# Patient Record
Sex: MALE | Race: WHITE | NOT HISPANIC OR LATINO | Employment: FULL TIME | URBAN - NONMETROPOLITAN AREA
[De-identification: names, ages, dates, MRNs, and addresses within clinical notes are randomized per-mention and may not be internally consistent; named-entity substitution may affect disease eponyms.]

---

## 2018-12-31 ENCOUNTER — TRANSCRIBE ORDERS (OUTPATIENT)
Dept: ADMINISTRATIVE | Facility: HOSPITAL | Age: 33
End: 2018-12-31

## 2018-12-31 ENCOUNTER — LAB (OUTPATIENT)
Dept: LAB | Facility: HOSPITAL | Age: 33
End: 2018-12-31
Attending: PEDIATRICS

## 2018-12-31 DIAGNOSIS — R50.9 FEVER, UNSPECIFIED FEVER CAUSE: ICD-10-CM

## 2018-12-31 DIAGNOSIS — R50.9 FEVER, UNSPECIFIED FEVER CAUSE: Primary | ICD-10-CM

## 2018-12-31 LAB
FLUAV AG NPH QL: NEGATIVE
FLUBV AG NPH QL IA: NEGATIVE

## 2018-12-31 PROCEDURE — 87804 INFLUENZA ASSAY W/OPTIC: CPT

## 2021-07-28 ENCOUNTER — OFFICE VISIT (OUTPATIENT)
Dept: OTOLARYNGOLOGY | Facility: CLINIC | Age: 36
End: 2021-07-28

## 2021-07-28 VITALS — TEMPERATURE: 97.2 F | DIASTOLIC BLOOD PRESSURE: 92 MMHG | HEART RATE: 96 BPM | SYSTOLIC BLOOD PRESSURE: 152 MMHG

## 2021-07-28 DIAGNOSIS — R22.1 NECK MASS: Primary | ICD-10-CM

## 2021-07-28 PROCEDURE — 99203 OFFICE O/P NEW LOW 30 MIN: CPT | Performed by: OTOLARYNGOLOGY

## 2021-07-28 NOTE — PROGRESS NOTES
PRIMARY CARE PROVIDER: Nahomy Gardner APRN  REFERRING PROVIDER: No ref. provider found    Chief Complaint   Patient presents with   • Cyst     Cyst to neck       Subjective   History of Present Illness:  Edvin Simmons is a  35 y.o. male who presents for consultation regarding a mas of the midline anterior neck.  The lesion has the following characteristics:    Quality: raised nodule  Severity: moderate  Duration: >10 years  Timing: constant  Modifying Factors: no dysphagia or family history of thyroid disorders  Associated Signs & Symptoms: no lymph node swelling.  Has never drained or become infected.    Review of Systems:  Review of Systems   Constitutional: Negative for chills, fatigue, fever and unexpected weight change.   HENT: Negative for facial swelling.    Respiratory: Negative for cough, chest tightness and shortness of breath.    Cardiovascular: Negative for chest pain.   Musculoskeletal: Negative for neck pain.   Skin: Negative for color change.   Neurological: Negative for facial asymmetry.   Hematological: Negative for adenopathy. Does not bruise/bleed easily.       Past History:  Past Medical History:   Diagnosis Date   • Acid reflux      History reviewed. No pertinent surgical history.  History reviewed. No pertinent family history.  Social History     Tobacco Use   • Smoking status: Never Smoker   • Smokeless tobacco: Never Used   Substance Use Topics   • Alcohol use: Not on file   • Drug use: No     Allergies:  Patient has no known allergies.    Current Outpatient Medications:   •  esomeprazole (NexIUM) 40 MG packet, Take 40 mg by mouth., Disp: , Rfl:   •  triamcinolone (KENALOG) 0.5 % cream, Apply  topically 3 (Three) Times a Day., Disp: 15 g, Rfl: 0    Objective     Vital Signs:  Temp:  [97.2 °F (36.2 °C)] 97.2 °F (36.2 °C)  Heart Rate:  [96] 96  BP: (152)/(92) 152/92    Physical Exam:  Physical Exam  Vitals and nursing note reviewed.   Constitutional:       General: He is not in acute  distress.     Appearance: He is well-developed. He is not diaphoretic.   HENT:      Head: Normocephalic and atraumatic.      Right Ear: External ear normal.      Left Ear: External ear normal.      Nose: Nose normal.      Mouth/Throat:      Lips: Pink.      Mouth: Mucous membranes are moist.      Dentition: Normal dentition. No gingival swelling.      Tongue: No lesions. Tongue does not deviate from midline.      Palate: No mass and lesions.      Pharynx: Oropharynx is clear.   Eyes:      General: No scleral icterus.        Right eye: No discharge.         Left eye: No discharge.      Conjunctiva/sclera: Conjunctivae normal.      Pupils: Pupils are equal, round, and reactive to light.   Neck:      Thyroid: No thyromegaly.      Vascular: No JVD.      Trachea: No tracheal deviation.     Pulmonary:      Effort: Pulmonary effort is normal.      Breath sounds: No stridor.   Musculoskeletal:         General: No deformity. Normal range of motion.      Cervical back: Normal range of motion and neck supple.   Lymphadenopathy:      Cervical: No cervical adenopathy.   Skin:     General: Skin is warm and dry.      Coloration: Skin is not pale.      Findings: No erythema or rash.   Neurological:      Mental Status: He is alert and oriented to person, place, and time.      Cranial Nerves: No cranial nerve deficit.      Coordination: Coordination normal.   Psychiatric:         Speech: Speech normal.         Behavior: Behavior normal. Behavior is cooperative.         Thought Content: Thought content normal.         Judgment: Judgment normal.         Operative plan:        Assessment   1. Neck mass        Plan     I have offered excision of the cyst of the neck with  permanent section analysis and likely  linear closure closure in the office under local anesthesia.  This palpates unrelated to the thyroid.  I do not feel that preoperative ultrasound of the thyroid is necessary, as he has normal palpable thyroid, and this does not  elevate with swallowing.    Discussion of skin lesion. Discussed risks, benefits, alternatives, and possible complications of excision of the skin lesion with reconstruction utilizing local tissue rearrangement, full-thickness skin grafting, or local interpolated flaps. Risks include, but are not limited too: bleeding, infection, hematoma, recurrence, need for additional procedures, flap failure, cosmetic deformity. Patient understands risks and would like to proceed with surgery.     My findings and recommendations were discussed and questions were answered.     Shon Cox MD  07/28/21  10:53 CDT

## 2021-07-28 NOTE — PATIENT INSTRUCTIONS
CONTACT INFORMATION:  The main office phone number is [phone]. For emergencies after hours and on weekends, this number will convert over to our answering service and the on call provider will answer. Please try to keep non emergent phone calls/ questions to office hours 9am-5pm Monday through Friday.     angelMD  As an alternative, you can sign up and use the Epic MyChart system for more direct and quicker access for non emergent questions/ problems.  [facility] angelMD allows you to send messages to your doctor, view your test results, renew your prescriptions, schedule appointments, and more. To sign up, go to [employer] and click on the Sign Up Now link in the New User? box. Enter your angelMD Activation Code exactly as it appears below along with the last four digits of your Social Security Number and your Date of Birth () to complete the sign-up process. If you do not sign up before the expiration date, you must request a new code.    angelMD Activation Code: [email or web address]  Expires: 2021 10:52 AM    If you have questions, you can email [email or web address] or call [phone] to talk to our angelMD staff. Remember, angelMD is NOT to be used for urgent needs. For medical emergencies, dial 911.

## 2021-09-08 ENCOUNTER — TRANSCRIBE ORDERS (OUTPATIENT)
Dept: LAB | Facility: HOSPITAL | Age: 36
End: 2021-09-08

## 2021-09-08 DIAGNOSIS — Z01.818 PREOP TESTING: Primary | ICD-10-CM

## 2021-09-11 ENCOUNTER — LAB (OUTPATIENT)
Dept: LAB | Facility: HOSPITAL | Age: 36
End: 2021-09-11

## 2021-09-11 PROCEDURE — U0004 COV-19 TEST NON-CDC HGH THRU: HCPCS | Performed by: OTOLARYNGOLOGY

## 2021-09-11 PROCEDURE — C9803 HOPD COVID-19 SPEC COLLECT: HCPCS | Performed by: OTOLARYNGOLOGY

## 2021-09-12 LAB — SARS-COV-2 ORF1AB RESP QL NAA+PROBE: NOT DETECTED

## 2021-09-15 ENCOUNTER — PROCEDURE VISIT (OUTPATIENT)
Dept: OTOLARYNGOLOGY | Facility: CLINIC | Age: 36
End: 2021-09-15

## 2021-09-15 VITALS — SYSTOLIC BLOOD PRESSURE: 140 MMHG | TEMPERATURE: 97.2 F | HEART RATE: 102 BPM | DIASTOLIC BLOOD PRESSURE: 87 MMHG

## 2021-09-15 DIAGNOSIS — R22.1 NECK MASS: Primary | ICD-10-CM

## 2021-09-15 PROCEDURE — 13131 CMPLX RPR F/C/C/M/N/AX/G/H/F: CPT | Performed by: OTOLARYNGOLOGY

## 2021-09-15 PROCEDURE — 11422 EXC H-F-NK-SP B9+MARG 1.1-2: CPT | Performed by: OTOLARYNGOLOGY

## 2021-09-15 PROCEDURE — 88304 TISSUE EXAM BY PATHOLOGIST: CPT | Performed by: OTOLARYNGOLOGY

## 2021-09-15 NOTE — PROGRESS NOTES
PATIENT NAME:  Edvin Simmons     DATE:  09/15/21    PREOPERATIVE DIAGNOSIS:  Cyst of neck    POSTOPERATIVE DIAGNOSIS:  Cyst of neck    PROCEDURE:  Excision of cyst of midline neck, 1.5 cm, subcutaneous    SURGEON:  Shon Cox MD, FACS    FACILITY: [facility] Office Procedure Room    ANESTHESIA:  Local with 2 cc 1% lidocaine and 1:100,000 epinephrine    DICTATED BY:  Shon Cox MD, FACS    IVF: None    IMPLANTS: None    DRAINS: None    SPECIMENS: Cyst of neck    EBL: Scant    COMPLICATIONS: None    INDICATIONS FOR SURGERY: Mr. Simmons presented with an enlarging cyst of his neck.  He opted for surgical excision.    OPERATIVE FINDINGS: The cyst measured 1.5 cm and is consistent with an epidermal inclusion cyst.    OPERATIVE DETAILS:       After patient verification consent material was reviewed, the patient was taken to the procedure room and laid supine on the procedure table.  The skin at the area was cleansed with alcohol, the area marked, the patient was then handed a mirror where we reviewed the intended excision, and verified the consent.  This served as the formal timeout procedure.  The skin was  infiltrated with 1% lidocaine with 1-100,000 epinephrine.    After approximately 15 minutes, the skin was tested for anesthesia and deemed appropriate.  The skin was cleansed with Hibiclense and the patient was draped with sterile towels.    A fusiform incision was created using a 15 blade and the underlying cyst was identified and removed using curved iris scissors.  The skin was then closed using a deep 4-0 undyed Vicryl suture followed by running, locking 5-0 fast-absorbing gut.  Antibiotic ointment was placed to the incision.  The patient tolerated the procedure without any complication.      DISPOSITION:  The procedures were completed without complication and tolerated well.  The patient was released in the company of himself to return home in satisfactory condition.  A follow-up  appointment has been scheduled, routine post-op medications prescribed (if required), and post-op instructions were given to the responsible party.           Shon Cox MD, FACS  Board Certified Facial Plastic and Reconstructive Surgery  Board Certified Otolaryngology -- Head and Neck Surgery    Electronically signed by Shon Cox MD, 09/15/21, 4:56 PM CDT.

## 2021-09-15 NOTE — PATIENT INSTRUCTIONS
CONTACT INFORMATION:  The main office phone number is [phone]. For emergencies after hours and on weekends, this number will convert over to our answering service and the on call provider will answer. Please try to keep non emergent phone calls/ questions to office hours 9am-5pm Monday through Friday.     Always Prepped  As an alternative, you can sign up and use the Epic MyChart system for more direct and quicker access for non emergent questions/ problems.  [facility] Always Prepped allows you to send messages to your doctor, view your test results, renew your prescriptions, schedule appointments, and more. To sign up, go to [employer] and click on the Sign Up Now link in the New User? box. Enter your Always Prepped Activation Code exactly as it appears below along with the last four digits of your Social Security Number and your Date of Birth () to complete the sign-up process. If you do not sign up before the expiration date, you must request a new code.    Always Prepped Activation Code: [email or web address]  Expires: 10/11/2021  9:00 AM    If you have questions, you can email [email or web address] or call [phone] to talk to our Always Prepped staff. Remember, Always Prepped is NOT to be used for urgent needs. For medical emergencies, dial 911.

## 2021-09-23 ENCOUNTER — TELEPHONE (OUTPATIENT)
Dept: OTOLARYNGOLOGY | Facility: CLINIC | Age: 36
End: 2021-09-23

## 2021-09-23 LAB
CYTO UR: NORMAL
LAB AP CASE REPORT: NORMAL
LAB AP CLINICAL INFORMATION: NORMAL
PATH REPORT.FINAL DX SPEC: NORMAL
PATH REPORT.GROSS SPEC: NORMAL

## 2021-09-23 NOTE — TELEPHONE ENCOUNTER
"Patient has been contacted with result of his biopsy. All is cleared \"cyst\" patient will follow up as need it.  "

## 2023-10-18 ENCOUNTER — OFFICE VISIT (OUTPATIENT)
Age: 38
End: 2023-10-18
Payer: COMMERCIAL

## 2023-10-18 VITALS
HEART RATE: 84 BPM | RESPIRATION RATE: 20 BRPM | DIASTOLIC BLOOD PRESSURE: 76 MMHG | WEIGHT: 203 LBS | TEMPERATURE: 98 F | BODY MASS INDEX: 31.86 KG/M2 | SYSTOLIC BLOOD PRESSURE: 148 MMHG | HEIGHT: 67 IN

## 2023-10-18 DIAGNOSIS — Z98.890 H/O REMOVAL OF NECK CYST: Primary | ICD-10-CM

## 2023-10-18 DIAGNOSIS — R22.1 NECK MASS: ICD-10-CM

## 2023-10-18 NOTE — PROGRESS NOTES
PRIMARY CARE PROVIDER: Provider, No Known  REFERRING PROVIDER: No ref. provider found    Chief Complaint   Patient presents with    Follow-up     Cyst of neck       Subjective   History of Present Illness:  Edvin Simmons is a  37 y.o. male who presents with complaints of a submental fullness.  He reports that 3 weeks ago, he developed a large swelling in the midline under his jaw.  This occurred all of a sudden and was above his Josef's apple.  Approximately 2 weeks later, he developed a sinus infection with sinus congestion.  He started on an antibiotic, which has helped to decrease the size of the submental fullness.  He has had a history of a cyst that I removed from his lower midline neck (9/15/21), much inferior to his present complaint.  He denies any dysphagia.  When I asked if the fullness elevated when he swallowed, he said that he did not.        Review of Systems:  Review of Systems   Constitutional:  Negative for chills, fatigue, fever and unexpected weight change.   HENT:  Negative for dental problem and facial swelling.    Respiratory:  Negative for cough, chest tightness and shortness of breath.    Cardiovascular:  Negative for chest pain.   Musculoskeletal:  Negative for neck pain.   Skin:  Negative for color change.   Neurological:  Negative for facial asymmetry.   Hematological:  Negative for adenopathy. Does not bruise/bleed easily.       Past History:  Past Medical History:   Diagnosis Date    Acid reflux     Gout      History reviewed. No pertinent surgical history.  History reviewed. No pertinent family history.  Social History     Tobacco Use    Smoking status: Never    Smokeless tobacco: Never   Vaping Use    Vaping Use: Never used   Substance Use Topics    Alcohol use: Never    Drug use: No     Allergies:  Patient has no known allergies.    Current Outpatient Medications:     allopurinol (ZYLOPRIM) 100 MG tablet, Take 1 tablet by mouth Daily., Disp: , Rfl:     esomeprazole (NexIUM) 40 MG  packet, Take 40 mg by mouth., Disp: , Rfl:       Objective     Vital Signs:  Temp:  [98 °F (36.7 °C)] 98 °F (36.7 °C)  Heart Rate:  [84] 84  Resp:  [20] 20  BP: (148)/(76) 148/76    Physical Exam:  Physical Exam  Vitals and nursing note reviewed.   Constitutional:       General: He is not in acute distress.     Appearance: He is well-developed. He is not diaphoretic.   HENT:      Head: Normocephalic and atraumatic.        Right Ear: External ear normal.      Left Ear: External ear normal.      Nose: Nose normal.      Mouth/Throat:      Lips: Pink.      Mouth: Mucous membranes are moist. No injury.      Dentition: Normal dentition.      Tongue: No lesions.      Palate: No mass.      Pharynx: Oropharynx is clear.      Tonsils: 2+ on the right. 2+ on the left.   Eyes:      General: No scleral icterus.        Right eye: No discharge.         Left eye: No discharge.      Conjunctiva/sclera: Conjunctivae normal.      Pupils: Pupils are equal, round, and reactive to light.   Neck:      Thyroid: No thyromegaly.      Vascular: No JVD.      Trachea: No tracheal deviation.   Pulmonary:      Effort: Pulmonary effort is normal.      Breath sounds: No stridor.   Musculoskeletal:         General: No deformity. Normal range of motion.      Cervical back: Normal range of motion and neck supple.   Lymphadenopathy:      Cervical: No cervical adenopathy.   Skin:     General: Skin is warm and dry.      Coloration: Skin is not pale.      Findings: No erythema or rash.   Neurological:      Mental Status: He is alert and oriented to person, place, and time.      Cranial Nerves: No cranial nerve deficit.      Coordination: Coordination normal.   Psychiatric:         Speech: Speech normal.         Behavior: Behavior normal. Behavior is cooperative.         Thought Content: Thought content normal.         Judgment: Judgment normal.         Prior Cyst:      Results Review:         Assessment   Assessment:  1. H/O removal of neck cyst    2.  Neck mass        Plan   Plan:  The fullness in question is somewhat suspicious for a thyroglossal duct cyst, that recently became inflamed and swollen.  According to Mr. Simmons, this did not elevate when he swallowed, somewhat less consistent with a thyroglossal duct cyst.  I would like to proceed with a contrasted CT scan of the neck to assess.  Based on the location, I do not think this is within the thyroid gland itself.  Once we have that CT scan, we will further evaluate for possible treatment options.      Patient Instructions        CONTACT INFORMATION:  The main office phone number is [phone]. For emergencies after hours and on weekends, this number will convert over to our answering service and the on call provider will answer. Please try to keep non emergent phone calls/ questions to office hours 9am-5pm Monday through Friday.     Quippi  As an alternative, you can sign up and use the Epic MyChart system for more direct and quicker access for non emergent questions/ problems.  [employer] allows you to send messages to your doctor, view your test results, renew your prescriptions, schedule appointments, and more. To sign up, go to "[employer].    If you have questions, you can email [email or web address] or call [phone] to talk to our Quippi staff. Remember, Quippi is NOT to be used for urgent needs. For medical emergencies, dial 911.          My findings and recommendations were discussed and questions were answered.     Shon Cox MD  10/18/23  12:55 CDT

## 2023-10-18 NOTE — PATIENT INSTRUCTIONS
CONTACT INFORMATION:  The main office phone number is [phone]. For emergencies after hours and on weekends, this number will convert over to our answering service and the on call provider will answer. Please try to keep non emergent phone calls/ questions to office hours 9am-5pm Monday through Friday.     Ghost  As an alternative, you can sign up and use the Epic MyChart system for more direct and quicker access for non emergent questions/ problems.  [facility] Ghost allows you to send messages to your doctor, view your test results, renew your prescriptions, schedule appointments, and more. To sign up, go to [email or web address].    If you have questions, you can email [email or web address] or call [phone] to talk to our Ghost staff. Remember, Ghost is NOT to be used for urgent needs. For medical emergencies, dial 911.

## 2023-10-19 DIAGNOSIS — R22.1 NECK MASS: Primary | ICD-10-CM

## 2023-11-03 ENCOUNTER — HOSPITAL ENCOUNTER (OUTPATIENT)
Dept: CT IMAGING | Facility: HOSPITAL | Age: 38
Discharge: HOME | End: 2023-11-03
Admitting: OTOLARYNGOLOGY
Payer: COMMERCIAL

## 2023-11-03 DIAGNOSIS — R22.1 NECK MASS: ICD-10-CM

## 2023-11-03 PROCEDURE — 25510000001 IOPAMIDOL 61 % SOLUTION: Performed by: OTOLARYNGOLOGY

## 2023-11-03 PROCEDURE — 70491 CT SOFT TISSUE NECK W/DYE: CPT

## 2023-11-03 RX ADMIN — IOPAMIDOL 100 ML: 612 INJECTION, SOLUTION INTRAVENOUS at 09:28

## 2023-11-06 DIAGNOSIS — E04.1 THYROID NODULE: Primary | ICD-10-CM

## 2023-11-07 ENCOUNTER — TRANSCRIBE ORDERS (OUTPATIENT)
Dept: ADMINISTRATIVE | Facility: HOSPITAL | Age: 38
End: 2023-11-07
Payer: COMMERCIAL

## 2023-11-07 DIAGNOSIS — H47.11 PAPILLEDEMA ASSOCIATED WITH INCREASED INTRACRANIAL PRESSURE: Primary | ICD-10-CM

## 2023-12-06 ENCOUNTER — OFFICE VISIT (OUTPATIENT)
Age: 38
End: 2023-12-06
Payer: COMMERCIAL

## 2023-12-06 VITALS — TEMPERATURE: 97.8 F | HEART RATE: 80 BPM | DIASTOLIC BLOOD PRESSURE: 86 MMHG | SYSTOLIC BLOOD PRESSURE: 146 MMHG

## 2023-12-06 DIAGNOSIS — R22.1 NECK MASS: ICD-10-CM

## 2023-12-06 DIAGNOSIS — E04.1 THYROID NODULE: Primary | ICD-10-CM

## 2023-12-06 RX ORDER — ACETAZOLAMIDE 500 MG/1
CAPSULE, EXTENDED RELEASE ORAL
COMMUNITY
Start: 2023-12-06

## 2023-12-06 NOTE — PROGRESS NOTES
PRIMARY CARE PROVIDER: Bogdan Martinez MD  REFERRING PROVIDER: No ref. provider found    Chief Complaint   Patient presents with    Post-op Follow-up     Follow up with US        Subjective   History of Present Illness:  Edvin Simmons is a  38 y.o. male who returns to the office for imaging review.  On 9/15/21, I removed a superficial epidermal inclusion cyst of the left neck.  When seen on 10/18/23, he complained of a 3 week history of a large swelling neck, under his jaw, above his Josef's apple.  Approximately 2 weeks later, he developed a sinus infection with sinus congestion.  He was started on an antibiotic, which has helped to decrease the size of the submental fullness.  Today, he is doing well without complaint.    No family history of thyroid issues.      Review of Systems:  Review of Systems   Constitutional:  Negative for chills, fatigue, fever and unexpected weight change.   HENT:  Negative for dental problem and facial swelling.    Respiratory:  Negative for cough, chest tightness and shortness of breath.    Cardiovascular:  Negative for chest pain.   Musculoskeletal:  Negative for neck pain.   Skin:  Negative for color change.   Neurological:  Negative for facial asymmetry.   Hematological:  Negative for adenopathy. Does not bruise/bleed easily.       Past History:  Past Medical History:   Diagnosis Date    Acid reflux     Gout      History reviewed. No pertinent surgical history.  History reviewed. No pertinent family history.  Social History     Tobacco Use    Smoking status: Never    Smokeless tobacco: Never   Vaping Use    Vaping Use: Never used   Substance Use Topics    Alcohol use: Never    Drug use: No     Allergies:  Patient has no known allergies.    Current Outpatient Medications:     acetaZOLAMIDE (DIAMOX) 500 MG capsule, , Disp: , Rfl:     allopurinol (ZYLOPRIM) 100 MG tablet, Take 1 tablet by mouth Daily., Disp: , Rfl:     esomeprazole (NexIUM) 40 MG packet, Take 40 mg by mouth.,  Disp: , Rfl:       Objective     Vital Signs:  Temp:  [97.8 °F (36.6 °C)] 97.8 °F (36.6 °C)  Heart Rate:  [80] 80  BP: (146)/(86) 146/86    Physical Exam:  Physical Exam  Vitals and nursing note reviewed.   Constitutional:       General: He is not in acute distress.     Appearance: He is well-developed. He is not diaphoretic.   HENT:      Head: Normocephalic and atraumatic.        Right Ear: External ear normal.      Left Ear: External ear normal.      Nose: Nose normal.      Mouth/Throat:      Lips: Pink.      Mouth: Mucous membranes are moist. No injury.      Dentition: Normal dentition.      Tongue: No lesions.      Palate: No mass.      Pharynx: Oropharynx is clear.      Tonsils: 2+ on the right. 2+ on the left.   Eyes:      General: No scleral icterus.        Right eye: No discharge.         Left eye: No discharge.      Conjunctiva/sclera: Conjunctivae normal.      Pupils: Pupils are equal, round, and reactive to light.   Neck:      Thyroid: Thyroid mass (Right lobe) and thyromegaly present.      Vascular: No JVD.      Trachea: No tracheal deviation.   Pulmonary:      Effort: Pulmonary effort is normal.      Breath sounds: No stridor.   Musculoskeletal:         General: No deformity. Normal range of motion.      Cervical back: Normal range of motion and neck supple.   Lymphadenopathy:      Cervical: No cervical adenopathy.   Skin:     General: Skin is warm and dry.      Coloration: Skin is not pale.      Findings: No erythema or rash.   Neurological:      Mental Status: He is alert and oriented to person, place, and time.      Cranial Nerves: No cranial nerve deficit.      Coordination: Coordination normal.   Psychiatric:         Speech: Speech normal.         Behavior: Behavior normal. Behavior is cooperative.         Thought Content: Thought content normal.         Judgment: Judgment normal.         Prior Cyst:      Results Review:         CT scan from October 19, 2023 was reviewed.  There is a soft tissue  fullness in the midline at the level of the thyroid cartilage.  There is also a abnormal appearing thyroid tissue on the right.            US from today:                Assessment   Assessment:  1. Thyroid nodule    2. Neck mass          Plan   Plan:    He has a right thyroid mass this is concerning.  I would also like to assess to see if the midline mass is c/w thyroid tissue.  We will set him up for fine-needle aspiration of both.  We will obtain a TSH baseline.  I discussed the possibility of thyroid lobectomy versus thyroidectomy and possible Sistrunk (especially if the FNA is concerning for cancer).  We discussed the function of the thyroid gland.    My findings and recommendations were discussed and questions were answered.     Shon Cox MD  12/06/23  14:04 CST

## 2023-12-18 ENCOUNTER — HOSPITAL ENCOUNTER (OUTPATIENT)
Dept: ULTRASOUND IMAGING | Facility: HOSPITAL | Age: 38
Discharge: HOME | End: 2023-12-18
Payer: COMMERCIAL

## 2023-12-18 DIAGNOSIS — E04.1 THYROID NODULE: ICD-10-CM

## 2023-12-18 DIAGNOSIS — R22.1 NECK MASS: ICD-10-CM

## 2023-12-18 PROCEDURE — 88112 CYTOPATH CELL ENHANCE TECH: CPT | Performed by: NURSE PRACTITIONER

## 2023-12-18 PROCEDURE — 88172 CYTP DX EVAL FNA 1ST EA SITE: CPT | Performed by: NURSE PRACTITIONER

## 2023-12-18 PROCEDURE — 76942 ECHO GUIDE FOR BIOPSY: CPT

## 2023-12-19 LAB
BEAKER LAB AP INTRAOPERATIVE CONSULTATION: NORMAL
CYTO UR: NORMAL
LAB AP CASE REPORT: NORMAL
LAB AP CLINICAL INFORMATION: NORMAL
Lab: NORMAL
PATH REPORT.FINAL DX SPEC: NORMAL
PATH REPORT.GROSS SPEC: NORMAL

## 2023-12-22 ENCOUNTER — LAB (OUTPATIENT)
Dept: LAB | Facility: HOSPITAL | Age: 38
End: 2023-12-22
Payer: COMMERCIAL

## 2023-12-22 DIAGNOSIS — E04.1 THYROID NODULE: ICD-10-CM

## 2023-12-22 LAB — TSH SERPL DL<=0.05 MIU/L-ACNC: 1.65 UIU/ML (ref 0.27–4.2)

## 2023-12-22 PROCEDURE — 84443 ASSAY THYROID STIM HORMONE: CPT

## 2023-12-22 PROCEDURE — 36415 COLL VENOUS BLD VENIPUNCTURE: CPT

## 2023-12-24 ENCOUNTER — TELEPHONE (OUTPATIENT)
Age: 38
End: 2023-12-24
Payer: COMMERCIAL

## 2023-12-24 DIAGNOSIS — Q39.9: ICD-10-CM

## 2023-12-24 DIAGNOSIS — R91.1 LUNG NODULE SEEN ON IMAGING STUDY: Primary | ICD-10-CM

## 2023-12-24 NOTE — TELEPHONE ENCOUNTER
I called and spoke to the patient about the addendum to his CT scan of the neck.  The reading radiologist feels that this is the esophagus, but can potentially confirm this with a contrast esophagram.  He also noted some mediastinal lymphadenopathy and a right upper lobe nodule.  A CT scan of the chest was recommended.  I discussed that we will go ahead and order these, and reach out once we have the results.      Electronically signed by Shon Cox MD, 12/24/23, 9:54 AM CST.

## 2023-12-29 ENCOUNTER — TELEPHONE (OUTPATIENT)
Dept: INTERVENTIONAL RADIOLOGY/VASCULAR | Facility: HOSPITAL | Age: 38
End: 2023-12-29
Payer: COMMERCIAL

## 2024-01-25 ENCOUNTER — TELEPHONE (OUTPATIENT)
Dept: NEUROLOGY | Facility: CLINIC | Age: 39
End: 2024-01-25
Payer: COMMERCIAL

## 2024-01-26 ENCOUNTER — OFFICE VISIT (OUTPATIENT)
Dept: NEUROLOGY | Facility: CLINIC | Age: 39
End: 2024-01-26
Payer: COMMERCIAL

## 2024-01-26 VITALS
OXYGEN SATURATION: 97 % | WEIGHT: 190 LBS | SYSTOLIC BLOOD PRESSURE: 120 MMHG | HEART RATE: 78 BPM | HEIGHT: 67 IN | DIASTOLIC BLOOD PRESSURE: 80 MMHG | BODY MASS INDEX: 29.82 KG/M2

## 2024-01-26 DIAGNOSIS — H47.10 PAPILLEDEMA: Primary | ICD-10-CM

## 2024-01-26 RX ORDER — LOSARTAN POTASSIUM 25 MG/1
25 TABLET ORAL DAILY
COMMUNITY
Start: 2023-12-15

## 2024-01-26 NOTE — PROGRESS NOTES
Neurology Consult Note    [facility]  [address], [address]  [place] [address]  Phone: [phone]  Fax: [phone]    Referring Provider:   Bogdan Martinez MD  Primary Care Provider:  Bogdan Martinez MD    Reason for Consult:  Benign intracranial hypertension  Subjective        Edvin Simmons presents to [facility]    History of Present Illness  38-year-old male referred to me by Dr. Bogdan Martinez for evaluation of benign intracranial hypertension.  Patient was seen by Dr. Felder optometry at [facility] on 11/7/2023.  Patient had presented for an emergency visit due to blurred peripheral vision OU, headaches with visual disturbances.  Patient denied photophobia or other symptoms.  Review of the assessment of his optic nerve revealed 3+ papilledema bilaterally with hemorrhage on the superior ream of ONH of left optic nerve.  Patient was referred for MRI of his head and orbits with and without contrast.  Additional review of the record shows if MRI was clean, Dr. Felder had planned on starting Diamox.    Patient saw his PCP on 11/10/2023.  Patient was seen for 6-month follow-up.  He went to make sure his PCP was aware of recent diagnosis of cerebral pseudotumor.  Patient reported he saw Dr. Felder due to loss of peripheral vision.  Reportedly symptoms had resolved.    Today patient presents with his wife.  Edvin reports to me that he was at work when he developed sudden blurred vision from the lateral bilateral vision fields.  Reports this lasted approximately 2 to 3 hours.  He does endorse his vision was clear looking straight.  This did not improve if he closed 1 eye or not.  He denies any headaches.  He does not remember he had a headache during this event.    He has been started on Diamox 500 mg daily.  This was done prior to a lumbar puncture.  He also reports to me he is being treated at [facility] for thyroid cancer.   "He is having a possible thyroidectomy on February 29 of this year.  There is plan for radiation afterwards.    He denies any family history of neurological conditions.  He is employed as a  at [employer] in [facility] [employer].  He denies any head injuries or neck injuries.    Again he denies any headaches.  No fevers.  No chills.  No reported eye injuries.    He does report to me he has seen optometry again since being treated.  By his report the papilledema has improved.  He has had no further episodes of blurred vision or vision losses.  There is no problem list on file for this patient.       Past Medical History:   Diagnosis Date    Acid reflux     Gout         Social History     Socioeconomic History    Marital status:    Tobacco Use    Smoking status: Never    Smokeless tobacco: Never   Vaping Use    Vaping Use: Never used   Substance and Sexual Activity    Alcohol use: Never    Drug use: No    Sexual activity: Defer        No Known Allergies       Current Outpatient Medications:     acetaZOLAMIDE (DIAMOX) 500 MG capsule, , Disp: , Rfl:     allopurinol (ZYLOPRIM) 100 MG tablet, Take 1 tablet by mouth Daily., Disp: , Rfl:     esomeprazole (NexIUM) 40 MG packet, Take 40 mg by mouth., Disp: , Rfl:     losartan (COZAAR) 25 MG tablet, Take 1 tablet by mouth Daily., Disp: , Rfl:        Objective   Vital Signs:   /80 (BP Location: Left arm, Patient Position: Sitting, Cuff Size: Adult)   Pulse 78   Ht 170.2 cm (67.01\")   Wt 86.2 kg (190 lb)   SpO2 97%   BMI 29.75 kg/m²       Physical Exam  Vitals and nursing note reviewed.   Constitutional:       General: He is not in acute distress.     Appearance: He is not ill-appearing.   HENT:      Head: Normocephalic.   Eyes:      General: Lids are normal.         Right eye: No discharge.         Left eye: No discharge.      Extraocular Movements: Extraocular movements intact.      Conjunctiva/sclera:      Right eye: No hemorrhage.     " Left eye: No hemorrhage.     Pupils: Pupils are equal, round, and reactive to light.      Funduscopic exam:     Right eye: No hemorrhage. Red reflex present.         Left eye: No hemorrhage. Red reflex present.     Comments: Unable to identify optic disc   Pulmonary:      Effort: Pulmonary effort is normal. No respiratory distress.   Skin:     General: Skin is warm and dry.      Capillary Refill: Capillary refill takes less than 2 seconds.   Neurological:      Mental Status: He is alert.      Motor: Motor strength is normal.     Deep Tendon Reflexes: Reflexes are normal and symmetric.   Psychiatric:         Speech: Speech normal.        Neurological Exam  Mental Status  Alert. Oriented to person, place, time and situation. Speech is normal. Language is fluent with no aphasia.    Cranial Nerves  CN II: Vision test: Unable to identify optic disc. Visual fields full to confrontation.  CN III, IV, VI: Extraocular movements intact bilaterally. Normal lids and orbits bilaterally. Pupils equal round and reactive to light bilaterally.  CN V: Facial sensation is normal.  CN VII: Full and symmetric facial movement.  CN IX, X: Palate elevates symmetrically. Normal gag reflex.  CN XI: Shoulder shrug strength is normal.  CN XII: Tongue midline without atrophy or fasciculations.    Motor  Normal muscle bulk throughout. No fasciculations present. Normal muscle tone. No abnormal involuntary movements. Strength is 5/5 throughout all four extremities.    Sensory  Light touch is normal in upper and lower extremities.     Reflexes  Deep tendon reflexes are 2+ and symmetric in all four extremities.    Gait  Casual gait is normal including stance, stride, and arm swing.      Result Review :   The following data was reviewed by: JENNIFER Gallegos on 01/26/2024:         PROGRESS NOTES - SCAN - PROG NOTE_[facility]_11/07/23 (11/13/2023)       PROGRESS NOTES - SCAN - PROGRESS NOTE_[facility]_11-10-23 (11/10/2023)      PROGRESS NOTES - SCAN - PROGRESS NOTE_[facility]_11-10-23 (11/10/2023)                  Impression:  Edvin Simmons is a 38 y.o. male who presents for evaluation of intracranial hypertension.  Today patient reports improvement in his symptoms.  He has been on Diamox for approximately a month.  He has not had a lumbar puncture.  It is reassuring that he has had no further blurred vision.  I would recommend continuing with lumbar puncture to assess opening pressure as well as CSF studies to ensure no other etiologies.  Cannot diagnose idiopathic cranial hypertension until we perform lumbar puncture to assess opening pressure.  There is a chance his pressure could be normal since he has been on Diamox.    Diagnoses and all orders for this visit:    1. Papilledema (Primary)  -     IR Lumbar Puncture Diagnosis; Future  -     Notify Provider if Patient Taking Blood Thinning Agents; Standing  -     Check & Record Opening & Closing Pressures (LP); Standing  -     Protime-INR; Standing  -     Glucose, CSF - Cerebrospinal Fluid, Lumbar Puncture; Standing  -     Protein, CSF - Cerebrospinal Fluid, Lumbar Puncture; Standing  -     Culture, CSF - Cerebrospinal Fluid, Lumbar Puncture; Standing  -     Cell Count With Differential, CSF Use CSF Tube: 1; Standing  -     Cell Count With Differential, CSF Use CSF Tube: 4; Standing  -     Obtain Informed Consent; Standing  -     Ambulatory Referral to Ophthalmology        Plan:  Continue Diamox per other providers  Lumbar puncture with CSF studies  Referral with ophthalmology for formal evaluation  Obtain records from [facility] of last eye exam  Obtain records from [facility] radiology of MRIs performed  Follow-up with PCP as scheduled  Follow-up with me in 3 months or sooner if need be    The patient and I have discussed the plan of care and he is in full agreement at this time.     Follow Up   Return in about 3 months (around 4/26/2024) for  Papilledema.            Melina Katz, APRN  01/26/24  09:50 CST

## 2024-01-29 ENCOUNTER — TELEPHONE (OUTPATIENT)
Age: 39
End: 2024-01-29
Payer: COMMERCIAL

## 2024-01-29 ENCOUNTER — PATIENT ROUNDING (BHMG ONLY) (OUTPATIENT)
Dept: NEUROLOGY | Facility: CLINIC | Age: 39
End: 2024-01-29
Payer: COMMERCIAL

## 2024-01-29 NOTE — TELEPHONE ENCOUNTER
I was following up on his CT scan of the chest.  It looks like he is establish care with Raul Richards MD at [facility].  There is no answer, but wanted to assure that he had continued continuity of care.      Electronically signed by Shon Cox MD, 01/29/24, 11:31 AM CST.

## 2024-01-29 NOTE — TELEPHONE ENCOUNTER
He called back, after he realized he had missed call.  We confirmed that he had follow-up established at [facility].  They are planning his surgery in February.  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  He is scheduled for a CT scan of his chest, and they will also perform what sounds like laryngoscopy to assess the vocal fold mobility prior to surgery.  He may have a drain after surgery, and wonders if that can be removed here in [place].  I be happy to remove the drain, if that is okay with Dr. Raul Richards.      Electronically signed by Shon Cox MD, 01/29/24, 11:41 AM CST.

## 2024-01-29 NOTE — PROGRESS NOTES
January 29, 2024    Hello, may I speak with Edvin Simmosn?    My name is Dom      I am  with [facility] [place] [place] [facility]  [address]  [address]  [place] [place] [address] [phone].    Before we get started may I verify your date of birth? 1985    I am calling to officially welcome you to our practice and ask about your recent visit. Is this a good time to talk? yes    Tell me about your visit with us. What things went well?  Pt was pleased with [facility].       We're always looking for ways to make our patients' experiences even better. Do you have recommendations on ways we may improve?  no    Overall were you satisfied with your first visit to our practice? yes       I appreciate you taking the time to speak with me today. Is there anything else I can do for you? no      Thank you, and have a great day.

## 2024-02-06 ENCOUNTER — TELEPHONE (OUTPATIENT)
Age: 39
End: 2024-02-06
Payer: COMMERCIAL

## 2024-02-06 NOTE — TELEPHONE ENCOUNTER
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  I offered my personal cell phone to the call center and they informed me they would provide it to his treatment team.  I have not heard from them.    Electronically signed by Shon Cox MD, 02/06/24, 7:06 AM CST.

## 2024-02-16 ENCOUNTER — HOSPITAL ENCOUNTER (OUTPATIENT)
Dept: GENERAL RADIOLOGY | Facility: HOSPITAL | Age: 39
Discharge: HOME | End: 2024-02-16
Payer: COMMERCIAL

## 2024-02-16 VITALS
TEMPERATURE: 97.6 F | BODY MASS INDEX: 30.45 KG/M2 | DIASTOLIC BLOOD PRESSURE: 79 MMHG | HEIGHT: 67 IN | WEIGHT: 194 LBS | RESPIRATION RATE: 16 BRPM | OXYGEN SATURATION: 96 % | HEART RATE: 67 BPM | SYSTOLIC BLOOD PRESSURE: 113 MMHG

## 2024-02-16 DIAGNOSIS — H47.10 PAPILLEDEMA: ICD-10-CM

## 2024-02-16 LAB
APPEARANCE CSF: CLEAR
APPEARANCE CSF: CLEAR
APTT PPP: 29.6 SECONDS (ref 24.5–36)
COLOR CSF: COLORLESS
COLOR CSF: COLORLESS
COLOR SPUN CSF: COLORLESS
COLOR SPUN CSF: COLORLESS
GLUCOSE CSF-MCNC: 63 MG/DL (ref 40–70)
INR PPP: 1.05 (ref 0.91–1.09)
METHOD: ABNORMAL
METHOD: NORMAL
NUC CELL # CSF MANUAL: 0 /MM3 (ref 0–8)
NUC CELL # CSF MANUAL: 1 /MM3 (ref 0–8)
PLATELET # BLD AUTO: 409 10*3/MM3 (ref 140–450)
PROT CSF-MCNC: 64.1 MG/DL (ref 15–45)
PROTHROMBIN TIME: 13.9 SECONDS (ref 11.8–14.8)
RBC # CSF MANUAL: 0 /MM3 (ref 0–0)
RBC # CSF MANUAL: 1 /MM3 (ref 0–0)
SPECIMEN VOL CSF: 5 ML
SPECIMEN VOL CSF: 8 ML
TUBE # CSF: 1
TUBE # CSF: 4

## 2024-02-16 PROCEDURE — 85049 AUTOMATED PLATELET COUNT: CPT | Performed by: RADIOLOGY

## 2024-02-16 PROCEDURE — 84157 ASSAY OF PROTEIN OTHER: CPT

## 2024-02-16 PROCEDURE — 87015 SPECIMEN INFECT AGNT CONCNTJ: CPT

## 2024-02-16 PROCEDURE — 85730 THROMBOPLASTIN TIME PARTIAL: CPT | Performed by: RADIOLOGY

## 2024-02-16 PROCEDURE — 89050 BODY FLUID CELL COUNT: CPT

## 2024-02-16 PROCEDURE — 85610 PROTHROMBIN TIME: CPT | Performed by: RADIOLOGY

## 2024-02-16 PROCEDURE — 87070 CULTURE OTHR SPECIMN AEROBIC: CPT

## 2024-02-16 PROCEDURE — 82945 GLUCOSE OTHER FLUID: CPT

## 2024-02-16 PROCEDURE — 87205 SMEAR GRAM STAIN: CPT

## 2024-02-16 PROCEDURE — 25010000002 LIDOCAINE 1 % SOLUTION

## 2024-02-16 RX ORDER — LIDOCAINE HYDROCHLORIDE 10 MG/ML
10 INJECTION, SOLUTION INFILTRATION; PERINEURAL ONCE
Status: COMPLETED | OUTPATIENT
Start: 2024-02-16 | End: 2024-02-16

## 2024-02-16 RX ADMIN — LIDOCAINE HYDROCHLORIDE 10 ML: 10 INJECTION, SOLUTION INFILTRATION; PERINEURAL at 11:25

## 2024-02-19 ENCOUNTER — OFFICE VISIT (OUTPATIENT)
Dept: NEUROLOGY | Facility: CLINIC | Age: 39
End: 2024-02-19
Payer: COMMERCIAL

## 2024-02-19 VITALS
SYSTOLIC BLOOD PRESSURE: 120 MMHG | HEIGHT: 67 IN | BODY MASS INDEX: 30.45 KG/M2 | OXYGEN SATURATION: 97 % | DIASTOLIC BLOOD PRESSURE: 80 MMHG | HEART RATE: 103 BPM | WEIGHT: 194 LBS

## 2024-02-19 DIAGNOSIS — H47.10 PAPILLEDEMA: Primary | ICD-10-CM

## 2024-02-19 DIAGNOSIS — G97.1 POSTOPERATIVE SPINAL HEADACHE: ICD-10-CM

## 2024-02-19 LAB
BACTERIA SPEC AEROBE CULT: NORMAL
GRAM STN SPEC: NORMAL

## 2024-02-19 PROCEDURE — 99214 OFFICE O/P EST MOD 30 MIN: CPT

## 2024-02-19 NOTE — PROGRESS NOTES
Neurology Consult Note    [facility]  [address], [address]  [place] [address]  Phone: [phone]  Fax: [phone]    Referring Provider:   No ref. provider found  Primary Care Provider:  Bogdan Martinez MD    Reason for Consult:  Worsening headache   Subjective        Edvin Simmons presents to [facility]    History of Present Illness  38-year-old male seen for problem visit of increasing headache intensity.  Patient was last seen in clinic on 1/26/2024.  Reviewed that record shows patient be diagnosed with benign intracranial hypertension.  He was seen at [facility] on 11/7/2023.  He did have 3+ plaqued edema bilaterally.  Patient has been treated with Diamox 500 mg daily.    Today patient presents by himself.  He reports to me worsening headaches since his spinal tap on Friday.  His headache is worse with standing or sitting.  He does get relief with lying flat.  He has been taking ibuprofen.  No fevers.  No worsening visual disturbances.  Headaches are located in the occipital and neck region.    There is no problem list on file for this patient.       Past Medical History:   Diagnosis Date    Acid reflux     Gout         Social History     Socioeconomic History    Marital status:    Tobacco Use    Smoking status: Never    Smokeless tobacco: Never   Vaping Use    Vaping Use: Never used   Substance and Sexual Activity    Alcohol use: Never    Drug use: No    Sexual activity: Defer        No Known Allergies       Current Outpatient Medications:     acetaZOLAMIDE (DIAMOX) 500 MG capsule, , Disp: , Rfl:     allopurinol (ZYLOPRIM) 100 MG tablet, Take 1 tablet by mouth Daily., Disp: , Rfl:     esomeprazole (NexIUM) 40 MG packet, Take 40 mg by mouth., Disp: , Rfl:     losartan (COZAAR) 25 MG tablet, Take 1 tablet by mouth Daily., Disp: , Rfl:        Objective   Vital Signs:   /80 (BP Location: Left arm, Patient Position: Sitting, Cuff  "Size: Adult)   Pulse 103   Ht 170 cm (66.93\")   Wt 88 kg (194 lb)   SpO2 97%   BMI 30.45 kg/m²       Physical Exam  Vitals and nursing note reviewed.   Constitutional:       Appearance: Normal appearance.   HENT:      Head: Normocephalic.   Eyes:      General: Lids are normal.      Extraocular Movements: Extraocular movements intact.      Pupils: Pupils are equal, round, and reactive to light.   Pulmonary:      Effort: Pulmonary effort is normal. No respiratory distress.   Musculoskeletal:      Cervical back: No rigidity. Normal range of motion.   Skin:     General: Skin is warm and dry.   Neurological:      Mental Status: He is alert.      Motor: Motor strength is normal.  Psychiatric:         Speech: Speech normal.        Neurological Exam  Mental Status  Alert. Oriented to person, place, time and situation. Speech is normal. Language is fluent with no aphasia.    Cranial Nerves  CN II: Visual fields full to confrontation.  CN III, IV, VI: Extraocular movements intact bilaterally. Normal lids and orbits bilaterally. Pupils equal round and reactive to light bilaterally.  CN V: Facial sensation is normal.  CN VII: Full and symmetric facial movement.  CN IX, X: Palate elevates symmetrically. Normal gag reflex.  CN XI: Shoulder shrug strength is normal.  CN XII: Tongue midline without atrophy or fasciculations.    Motor  Normal muscle bulk throughout. No fasciculations present. Normal muscle tone. No abnormal involuntary movements. Strength is 5/5 throughout all four extremities.    Sensory  Light touch is normal in upper and lower extremities.     Gait  Casual gait is normal including stance, stride, and arm swing.      Result Review :   The following data was reviewed by: JENNIFER Gallegos on 02/19/2024:       Progress Notes by Melina Katz APRN (01/26/2024 09:15)     Hospital Outpatient Visit on 02/16/2024   Component Date Value Ref Range Status    Platelets 02/16/2024 409  140 - 450 10*3/mm3 Final "    Protime 02/16/2024 13.9  11.8 - 14.8 Seconds Final    INR 02/16/2024 1.05  0.91 - 1.09 Final    PTT 02/16/2024 29.6  24.5 - 36.0 seconds Final    Glucose, CSF 02/16/2024 63  40 - 70 mg/dL Final    Protein, Total (CSF) 02/16/2024 64.1 (H)  15.0 - 45.0 mg/dL Final    CSF Culture 02/16/2024 No growth at 3 days   Final    Gram Stain 02/16/2024 No WBCs or organisms seen   Final    Color, CSF 02/16/2024 Colorless  Colorless Final    Supernatant Color, CSF 02/16/2024 Colorless  Colorless Final    Appearance, CSF 02/16/2024 Clear  Clear Final    RBC, CSF 02/16/2024 0  0 - 0 /mm3 Final    Nucleated Cells, CSF 02/16/2024 0  0 - 8 /mm3 Final    Volume, CSF 02/16/2024 5.0  mL Final    Tube Number, CSF 02/16/2024 1   Final    Method: 02/16/2024 Hemacytometer Method   Final    Color, CSF 02/16/2024 Colorless  Colorless Final    Supernatant Color, CSF 02/16/2024 Colorless  Colorless Final    Appearance, CSF 02/16/2024 Clear  Clear Final    RBC, CSF 02/16/2024 1 (H)  0 - 0 /mm3 Final    Nucleated Cells, CSF 02/16/2024 1  0 - 8 /mm3 Final    Volume, CSF 02/16/2024 8.0  mL Final    Tube Number, CSF 02/16/2024 4   Final    Method: 02/16/2024 Hemacytometer Method   Final                    Impression:  Edvin iSmmons is a 38 y.o. male who presents for problem visit of worsening headache.  His headaches are likely related to spinal tap that was performed on Friday.  His headaches began immediately afterwards.  He does have worsening headaches with standing or sitting.  He does get relief when lying flat.  I discussed with patient going to the emergency room for potential blood patch versus being conservative and trying over-the-counter medications.  We will try increasing his caffeine intake as well as taking Excedrin for headache.  Will get benefit with Excedrin as it does have caffeine and acetaminophen.  If it anytime patient's headache worsens or he develops new symptoms he will return to the emergency room  immediately.    Diagnoses and all orders for this visit:    1. Papilledema (Primary)    2. Postoperative spinal headache        Plan:  Increase caffeine  Excedrin for headache per bottle directions  Provide office with update in 24 hours  If no improvement, would recommend going to the ER for blood patch  Follow-up with PCP as scheduled  Follow-up with me as scheduled    The patient and I have discussed the plan of care and he is in full agreement at this time.     Follow Up   No follow-ups on file.            Melina Katz, JENNIFER  02/19/24  14:02 CST

## 2024-02-19 NOTE — LETTER
February 19, 2024     Patient: Edvin Simmons   YOB: 1985   Date of Visit: 2/19/2024       To Whom It May Concern:    It is my medical opinion that Edvin Simmons may return to work on 2/21/2024 .           Sincerely,        JENNIFER Gallegos    CC:   No Recipients

## 2024-02-21 ENCOUNTER — APPOINTMENT (OUTPATIENT)
Dept: CT IMAGING | Facility: HOSPITAL | Age: 39
End: 2024-02-21
Payer: COMMERCIAL

## 2024-02-21 ENCOUNTER — HOSPITAL ENCOUNTER (EMERGENCY)
Facility: HOSPITAL | Age: 39
Discharge: HOME | End: 2024-02-21
Admitting: INTERNAL MEDICINE
Payer: COMMERCIAL

## 2024-02-21 VITALS
BODY MASS INDEX: 29.35 KG/M2 | HEIGHT: 67 IN | RESPIRATION RATE: 18 BRPM | HEART RATE: 109 BPM | SYSTOLIC BLOOD PRESSURE: 101 MMHG | TEMPERATURE: 98.2 F | OXYGEN SATURATION: 96 % | WEIGHT: 187 LBS | DIASTOLIC BLOOD PRESSURE: 67 MMHG

## 2024-02-21 DIAGNOSIS — G97.1 SPINAL HEADACHE: Primary | ICD-10-CM

## 2024-02-21 LAB
ALBUMIN SERPL-MCNC: 4.6 G/DL (ref 3.5–5.2)
ALBUMIN/GLOB SERPL: 1.1 G/DL
ALP SERPL-CCNC: 132 U/L (ref 39–117)
ALT SERPL W P-5'-P-CCNC: 19 U/L (ref 1–41)
ANION GAP SERPL CALCULATED.3IONS-SCNC: 13 MMOL/L (ref 5–15)
APTT PPP: 27.6 SECONDS (ref 24.5–36)
AST SERPL-CCNC: 15 U/L (ref 1–40)
BASOPHILS # BLD AUTO: 0.05 10*3/MM3 (ref 0–0.2)
BASOPHILS NFR BLD AUTO: 0.2 % (ref 0–1.5)
BILIRUB SERPL-MCNC: 0.4 MG/DL (ref 0–1.2)
BUN SERPL-MCNC: 18 MG/DL (ref 6–20)
BUN/CREAT SERPL: 18.6 (ref 7–25)
CALCIUM SPEC-SCNC: 9.4 MG/DL (ref 8.6–10.5)
CHLORIDE SERPL-SCNC: 106 MMOL/L (ref 98–107)
CO2 SERPL-SCNC: 22 MMOL/L (ref 22–29)
CREAT SERPL-MCNC: 0.97 MG/DL (ref 0.76–1.27)
CRP SERPL-MCNC: 1 MG/DL (ref 0–0.5)
D-LACTATE SERPL-SCNC: 1.7 MMOL/L (ref 0.5–2)
DEPRECATED RDW RBC AUTO: 45.6 FL (ref 37–54)
EGFRCR SERPLBLD CKD-EPI 2021: 101.8 ML/MIN/1.73
EOSINOPHIL # BLD AUTO: 0.03 10*3/MM3 (ref 0–0.4)
EOSINOPHIL NFR BLD AUTO: 0.1 % (ref 0.3–6.2)
ERYTHROCYTE [DISTWIDTH] IN BLOOD BY AUTOMATED COUNT: 17.1 % (ref 12.3–15.4)
ERYTHROCYTE [SEDIMENTATION RATE] IN BLOOD: 11 MM/HR (ref 0–15)
GLOBULIN UR ELPH-MCNC: 4.1 GM/DL
GLUCOSE SERPL-MCNC: 120 MG/DL (ref 65–99)
HCT VFR BLD AUTO: 47.8 % (ref 37.5–51)
HGB BLD-MCNC: 16.4 G/DL (ref 13–17.7)
HOLD SPECIMEN: NORMAL
IMM GRANULOCYTES # BLD AUTO: 0.08 10*3/MM3 (ref 0–0.05)
IMM GRANULOCYTES NFR BLD AUTO: 0.4 % (ref 0–0.5)
INR PPP: 0.98 (ref 0.91–1.09)
LYMPHOCYTES # BLD AUTO: 0.61 10*3/MM3 (ref 0.7–3.1)
LYMPHOCYTES NFR BLD AUTO: 2.8 % (ref 19.6–45.3)
MAGNESIUM SERPL-MCNC: 2.1 MG/DL (ref 1.6–2.6)
MCH RBC QN AUTO: 27 PG (ref 26.6–33)
MCHC RBC AUTO-ENTMCNC: 34.3 G/DL (ref 31.5–35.7)
MCV RBC AUTO: 78.7 FL (ref 79–97)
MONOCYTES # BLD AUTO: 0.8 10*3/MM3 (ref 0.1–0.9)
MONOCYTES NFR BLD AUTO: 3.7 % (ref 5–12)
NEUTROPHILS NFR BLD AUTO: 19.84 10*3/MM3 (ref 1.7–7)
NEUTROPHILS NFR BLD AUTO: 92.8 % (ref 42.7–76)
NRBC BLD AUTO-RTO: 0 /100 WBC (ref 0–0.2)
PLATELET # BLD AUTO: 448 10*3/MM3 (ref 140–450)
PMV BLD AUTO: 9.8 FL (ref 6–12)
POTASSIUM SERPL-SCNC: 3.8 MMOL/L (ref 3.5–5.2)
PROT SERPL-MCNC: 8.7 G/DL (ref 6–8.5)
PROTHROMBIN TIME: 13.1 SECONDS (ref 11.8–14.8)
RBC # BLD AUTO: 6.07 10*6/MM3 (ref 4.14–5.8)
SODIUM SERPL-SCNC: 141 MMOL/L (ref 136–145)
WBC NRBC COR # BLD AUTO: 21.41 10*3/MM3 (ref 3.4–10.8)
WHOLE BLOOD HOLD COAG: NORMAL
WHOLE BLOOD HOLD SPECIMEN: NORMAL

## 2024-02-21 PROCEDURE — 85730 THROMBOPLASTIN TIME PARTIAL: CPT

## 2024-02-21 PROCEDURE — 85652 RBC SED RATE AUTOMATED: CPT

## 2024-02-21 PROCEDURE — 80053 COMPREHEN METABOLIC PANEL: CPT

## 2024-02-21 PROCEDURE — 96374 THER/PROPH/DIAG INJ IV PUSH: CPT

## 2024-02-21 PROCEDURE — 85025 COMPLETE CBC W/AUTO DIFF WBC: CPT

## 2024-02-21 PROCEDURE — 83605 ASSAY OF LACTIC ACID: CPT

## 2024-02-21 PROCEDURE — 83735 ASSAY OF MAGNESIUM: CPT

## 2024-02-21 PROCEDURE — 70450 CT HEAD/BRAIN W/O DYE: CPT

## 2024-02-21 PROCEDURE — 86140 C-REACTIVE PROTEIN: CPT

## 2024-02-21 PROCEDURE — 85610 PROTHROMBIN TIME: CPT

## 2024-02-21 PROCEDURE — 25010000002 DIPHENHYDRAMINE PER 50 MG

## 2024-02-21 PROCEDURE — 25810000003 SODIUM CHLORIDE 0.9 % SOLUTION

## 2024-02-21 PROCEDURE — 99284 EMERGENCY DEPT VISIT MOD MDM: CPT

## 2024-02-21 PROCEDURE — 25010000002 KETOROLAC TROMETHAMINE PER 15 MG

## 2024-02-21 PROCEDURE — 72131 CT LUMBAR SPINE W/O DYE: CPT

## 2024-02-21 PROCEDURE — 25010000002 PROCHLORPERAZINE 10 MG/2ML SOLUTION

## 2024-02-21 PROCEDURE — 96375 TX/PRO/DX INJ NEW DRUG ADDON: CPT

## 2024-02-21 RX ORDER — SODIUM CHLORIDE 0.9 % (FLUSH) 0.9 %
10 SYRINGE (ML) INJECTION AS NEEDED
Status: DISCONTINUED | OUTPATIENT
Start: 2024-02-21 | End: 2024-02-21 | Stop reason: HOSPADM

## 2024-02-21 RX ORDER — PROCHLORPERAZINE EDISYLATE 5 MG/ML
10 INJECTION INTRAMUSCULAR; INTRAVENOUS ONCE
Status: COMPLETED | OUTPATIENT
Start: 2024-02-21 | End: 2024-02-21

## 2024-02-21 RX ORDER — KETOROLAC TROMETHAMINE 15 MG/ML
15 INJECTION, SOLUTION INTRAMUSCULAR; INTRAVENOUS ONCE
Status: COMPLETED | OUTPATIENT
Start: 2024-02-21 | End: 2024-02-21

## 2024-02-21 RX ORDER — DIPHENHYDRAMINE HYDROCHLORIDE 50 MG/ML
25 INJECTION INTRAMUSCULAR; INTRAVENOUS ONCE
Status: COMPLETED | OUTPATIENT
Start: 2024-02-21 | End: 2024-02-21

## 2024-02-21 RX ADMIN — PROCHLORPERAZINE EDISYLATE 10 MG: 5 INJECTION INTRAMUSCULAR; INTRAVENOUS at 12:46

## 2024-02-21 RX ADMIN — KETOROLAC TROMETHAMINE 15 MG: 15 INJECTION, SOLUTION INTRAMUSCULAR; INTRAVENOUS at 13:06

## 2024-02-21 RX ADMIN — DIPHENHYDRAMINE HYDROCHLORIDE 25 MG: 50 INJECTION, SOLUTION INTRAMUSCULAR; INTRAVENOUS at 12:46

## 2024-02-21 RX ADMIN — SODIUM CHLORIDE 1000 ML: 9 INJECTION, SOLUTION INTRAVENOUS at 12:46

## 2024-02-21 NOTE — DISCHARGE INSTRUCTIONS
It was very nice to meet you, Edvin. Thank you for allowing us to take care of you today at [facility].    Today you were seen in the emergency department for your symptoms. Please understand that an ER evaluation is just the start of your evaluation. We do the best we can, but we are often unable to fully find what is causing your symptoms from one evaluation.  Because of this, the goal is to determine whether you need to be evaluated in the hospital or if it is safe for you to go home and see other doctors provided such as primary care physicians or specialist on an outpatient basis.     Like we discussed, I strongly urge that you follow up with your primary care doctor. Please call their office to set up an appointment as soon as possible so that you can be re-evaluated for improvement in your symptoms or for any other questions.  I have provided the information needed, including phone number, to call to set up an appointment below in these discharge papers.     Educational material has also been provided in the following pages regarding what we have discussed today.  Please return to registration main entrance tomorrow morning between 830 and 9 AM for blood patch.  You will need to check in with registration prior to.    Please return to the emergency room within 12-48 hours if you experience symptoms such as the following:   Fever, chills, chest pain or shortness of breath, pain with inspiration/expiration, pain that travels to your arms, neck or back, nausea, vomiting, severe headache, tearing pain in your chest, dizziness, feel as though you are about to pass out, OR if you have any worsening symptoms, or any other concerns.

## 2024-02-21 NOTE — ED PROVIDER NOTES
Subjective   History of Present Illness  Patient is a 38-year-old male that presents to the emergency department for new onset low back pain and vomiting following recent LP.  Patient had a lumbar puncture performed at this facility on 2/16/2024 for further evaluation of possible benign intracranial hypertension.  Patient states he began experiencing some peripheral vision loss bilaterally in October 2023.  He states that he follow-up with an ophthalmologist and was told he had papilledema and was placed on Diamox 500 mg daily with referral to neurology.  Patient reports he then had an MRI performed by neurology and was scheduled for a lumbar puncture. Patient states prior to LP being performed he did not experience any headache or symptoms.  He reports as soon as lumbar puncture procedure began he experienced a headache that radiated to the base of his skull and into his neck.  Patient states the headaches were worse with standing or sitting but he did experience some relief when lying flat. He states he followed up with JENNIFER Summers with neurology on Monday, 2/19/2024 regarding continued headaches after lumbar puncture.  He states that they did encourage patient to report to the ED at that time for potential blood patch versus trying over-the-counter remedies.  At that point, patient states he wanted to try over-the-counter remedies prior to ED encounter.  He states he tried to increase his caffeine intake as well as try over-the-counter options such as Tylenol, Excedrin, and Motrin but despite these efforts the headache continued.  Patient states upon awakening this morning he began experiencing pain to the low back as well as vomiting.  He denies any blurred vision, numbness, tingling, or loss of sensation to either lower extremities.  He denies any saddle anesthesias.  Denies any urinary or bowel incontinence.  Denies any fevers, chest pain, shortness of breath, or abdominal pain.     Review of Systems    Constitutional:  Positive for activity change.   Gastrointestinal:  Positive for nausea and vomiting.   Musculoskeletal:  Positive for back pain and neck pain.   Neurological:  Positive for headaches.   All other systems reviewed and are negative.      Past Medical History:   Diagnosis Date    Acid reflux     Gout        No Known Allergies    Past Surgical History:   Procedure Laterality Date    NO PAST SURGERIES         History reviewed. No pertinent family history.    Social History     Socioeconomic History    Marital status:    Tobacco Use    Smoking status: Never    Smokeless tobacco: Never   Vaping Use    Vaping Use: Never used   Substance and Sexual Activity    Alcohol use: Never    Drug use: No    Sexual activity: Defer           Objective   Physical Exam  Vitals and nursing note reviewed.   Constitutional:       Appearance: Normal appearance.      Comments: Nontoxic appearing. In no acute distress.    HENT:      Head: Normocephalic and atraumatic.      Right Ear: External ear normal.      Left Ear: External ear normal.      Nose: Nose normal.      Mouth/Throat:      Mouth: Mucous membranes are moist.      Pharynx: Oropharynx is clear.   Eyes:      Extraocular Movements: Extraocular movements intact.      Conjunctiva/sclera: Conjunctivae normal.      Pupils: Pupils are equal, round, and reactive to light.   Cardiovascular:      Rate and Rhythm: Normal rate and regular rhythm.      Pulses: Normal pulses.      Heart sounds: Normal heart sounds.   Pulmonary:      Effort: Pulmonary effort is normal. No respiratory distress.      Breath sounds: Normal breath sounds. No wheezing.   Chest:      Chest wall: No tenderness.   Abdominal:      General: Bowel sounds are normal. There is no distension.      Palpations: Abdomen is soft.      Tenderness: There is no abdominal tenderness. There is no right CVA tenderness, left CVA tenderness, guarding or rebound.   Musculoskeletal:         General: Normal range of  motion.      Cervical back: Normal range of motion and neck supple.      Right lower leg: No edema.      Left lower leg: No edema.   Skin:     General: Skin is warm and dry.      Capillary Refill: Capillary refill takes less than 2 seconds.   Neurological:      General: No focal deficit present.      Mental Status: He is alert and oriented to person, place, and time.      Sensory: Sensation is intact.      Motor: Motor function is intact.      Coordination: Coordination is intact.   Psychiatric:         Attention and Perception: Attention and perception normal.         Mood and Affect: Mood and affect normal.         Speech: Speech normal.         Behavior: Behavior normal. Behavior is cooperative.         Thought Content: Thought content normal.         Cognition and Memory: Cognition and memory normal.         Judgment: Judgment normal.       Labs Reviewed   COMPREHENSIVE METABOLIC PANEL - Abnormal; Notable for the following components:       Result Value    Glucose 120 (*)     Total Protein 8.7 (*)     Alkaline Phosphatase 132 (*)     All other components within normal limits    Narrative:     GFR Normal >60  Chronic Kidney Disease <60  Kidney Failure <15     C-REACTIVE PROTEIN - Abnormal; Notable for the following components:    C-Reactive Protein 1.00 (*)     All other components within normal limits   CBC WITH AUTO DIFFERENTIAL - Abnormal; Notable for the following components:    WBC 21.41 (*)     RBC 6.07 (*)     MCV 78.7 (*)     RDW 17.1 (*)     Neutrophil % 92.8 (*)     Lymphocyte % 2.8 (*)     Monocyte % 3.7 (*)     Eosinophil % 0.1 (*)     Neutrophils, Absolute 19.84 (*)     Lymphocytes, Absolute 0.61 (*)     Immature Grans, Absolute 0.08 (*)     All other components within normal limits   SEDIMENTATION RATE - Normal   MAGNESIUM - Normal   PROTIME-INR - Normal   APTT - Normal   LACTIC ACID, PLASMA - Normal   RAINBOW DRAW    Narrative:     The following orders were created for panel order [place]  Draw.  Procedure                               Abnormality         Status                     ---------                               -----------         ------                     Green Top (Gel)[[id number]]                                  Final result               Lavender Top[[id number]]                                     Final result               Red Top[[id number]]                                          Final result               Malone Top[[id number]]                                         Final result               Light Blue Top[[id number]]                                   Final result                 Please view results for these tests on the individual orders.   GREEN TOP   LAVENDER TOP   RED TOP   GRAY TOP   LIGHT BLUE TOP   CBC AND DIFFERENTIAL    Narrative:     The following orders were created for panel order CBC & Differential.  Procedure                               Abnormality         Status                     ---------                               -----------         ------                     CBC Auto Differential[[id number]]        Abnormal            Final result                 Please view results for these tests on the individual orders.      CT Head Without Contrast   Final Result   1. No hemorrhage, edema or mass effect.   2. Suggestion of a partially empty appearance of the sella turcica on   the sagittal images.           The full report of this exam was immediately signed and available to the   emergency room. The patient is currently in the emergency room.           This report was signed and finalized on 2/21/2024 1:56 PM by Dr. Christian Sweeney MD.          CT Lumbar Spine Without Contrast   Final Result       1.  Transitional anatomy at the lumbosacral junction with partial   sacralization of L5 on the RIGHT. There is also partial fusion of the   L5-S1 facet joints.       2.  Shallow posterior disc protrusions at L3-L4 and L4-L5 cause minimal   narrowing of the thecal sac.       3.   No acute osseous abnormality.           This report was signed and finalized on 2/21/2024 1:59 PM by Dr. Flo Tatum MD.               Procedures           ED Course  ED Course as of 02/22/24 1301   Wed Feb 21, 2024   1424 With Dr. Mcgovern neurologist on-call who states he will come to the ED to assess patient.  Patient was reevaluated and is reporting headache rating a 2/10 currently compared to 7-8/10 upon arrival. [KF]   9153 Spoke with anesthesiologist, Dr. Quintero regarding possibility of blood patch.  She states that she is unsure if they have enough staffing to perform this procedure at this time of day and will return call with definitive answer. [KF]      ED Course User Index  [KF] Edu Keen APRN                                             Medical Decision Making  Edvin Simmons is a 38 y.o. male who presents to the ED for new onset low back pain and vomiting following recent LP.  Patient had a lumbar puncture performed at this facility on 2/16/2024 for further evaluation of possible benign intracranial hypertension.  Patient states he began experiencing some peripheral vision loss bilaterally in October 2023.  He states that he follow-up with an ophthalmologist and was told he had papilledema and was placed on Diamox 500 mg daily with referral to neurology.  Patient reports he then had an MRI performed by neurology and was scheduled for a lumbar puncture. Patient states prior to LP being performed he did not experience any headache or symptoms.  He reports as soon as lumbar puncture procedure began he experienced a headache that radiated to the base of his skull and into his neck.  Patient states the headaches were worse with standing or sitting but he did experience some relief when lying flat. He states he followed up with JENNIFER Summers with neurology on Monday, 2/19/2024 regarding continued headaches after lumbar puncture.  He states that they did encourage patient to report to the ED at that  time for potential blood patch versus trying over-the-counter remedies.  At that point, patient states he wanted to try over-the-counter remedies prior to ED encounter.  He states he tried to increase his caffeine intake as well as try over-the-counter options such as Tylenol, Excedrin, and Motrin but despite these efforts the headache continued.  Patient states upon awakening this morning he began experiencing pain to the low back as well as vomiting.  He denies any blurred vision, numbness, tingling, or loss of sensation to either lower extremities.  He denies any saddle anesthesias.  Denies any urinary or bowel incontinence.  Denies any fevers, chest pain, shortness of breath, or abdominal pain.     Patient was non-toxic appearing on arrival. No acute distress was noted.  Vital signs stable.  Patient was tachycardic upon arrival.    Past medical history, surgical history, and medication regimen reviewed.     Previous notes, labs, imaging, and more reviewed.    Patient's presentation raises suspicion for differentials including, but not limited to, spinal headache, LP complication, epidural abscess, electrolyte imbalance, dehydration.    Please refer to above section of note for lab and imaging results that were reviewed and interpreted by radiology as well as attending physician.     Medications administered,   sodium chloride 0.9 % bolus 1,000 mL (0 mL Intravenous Stopped 2/21/24 1400)  prochlorperazine (COMPAZINE) injection 10 mg (10 mg Intravenous Given 2/21/24 1246)  diphenhydrAMINE (BENADRYL) injection 25 mg (25 mg Intravenous Given 2/21/24 1246)  ketorolac (TORADOL) injection 15 mg (15 mg Intravenous Given 2/21/24 1306)     Following medication administration patient reports he has had some relief in symptoms but is still experiencing slight headache.     , neurologist on-call assessed patient in the ED and is in agreement with proceeding with blood patch by anesthesia.    I spoke with anesthesiologist  and due to current caseload they are unable to perform blood patch today.  Outpatient order for blood patch by anesthesiologist was placed.  They recommend patient returning tomorrow morning between 830 and 9 AM for blood patch.    Given findings described above, patient's presentation is likely consistent with spinal headache.    I had an in-depth discussion with the patient regarding all lab and imaging results completed today's ED encounter.  Also discussed the need for blood patch but inability to receive this today.  I discussed with patient that he will need to report to the main entrance of the hospital tomorrow and checking with registration between 830 and 9 AM to receive blood patch by anesthesia.  Patient was also educated on concerning signs and symptoms that would warrant a quick return to the ED and he verbalized understanding of this. I answered all the questions regarding the emergency department evaluation, diagnosis, and treatment plan in plain and simple language that was understandable. We discussed that due to always having some diagnostic uncertainty while in the ER, there is always a chance that symptoms may change or new symptoms may reveal themselves after being discharged. Because of this, I stressed the importance of Edvin following up with their primary care provider. Patient informed that appointment will need to be done by calling their office to set up an appointment within the next few days or as soon as reasonably possible so that the symptoms can be re-evaluated for improvement or for any other questions. I also gave Edvin common sense return precautions and prompted patient to return to the emergency department within 24 - 48hrs if there are any new, worsening, or concerning symptoms. The patient verbalized understanding of the discharge instructions and agreed with them. Edvin was discharged in stable condition.     Dragon disclaimer:  Parts of this note may be an electronic  transcription/translation of spoken language to printed text using the Dragon dictation system.    Problems Addressed:  Spinal headache: complicated acute illness or injury    Amount and/or Complexity of Data Reviewed  Labs: ordered.  Radiology: ordered.    Risk  Prescription drug management.        Final diagnoses:   Spinal headache       ED Disposition  ED Disposition       ED Disposition   Discharge    Condition   Stable    Comment   --               Bogdan Martinez MD  [address] [facility] [address]  [phone]    Schedule an appointment as soon as possible for a visit       Melina Katz, APRN  [address] [facility] [address] [place] [id number]  [phone]    Schedule an appointment as soon as possible for a visit       [facility] EMERGENCY DEPARTMENT  [address] [facility] [address] [phone]    If symptoms worsen         Medication List      No changes were made to your prescriptions during this visit.            Edu Keen, APRN  02/22/24 1301

## 2024-02-21 NOTE — Clinical Note
[facility] EMERGENCY DEPARTMENT  [address] [place] [address] AVE  [facility] [address]  Phone: [phone]    Edvin Simmons was seen and treated in our emergency department on 2/21/2024.  He may return to work on 02/26/2024.         Thank you for choosing [facility].    Edu Keen, APRN

## 2024-04-25 ENCOUNTER — TELEPHONE (OUTPATIENT)
Dept: NEUROLOGY | Facility: CLINIC | Age: 39
End: 2024-04-25
Payer: COMMERCIAL

## 2024-04-26 ENCOUNTER — OFFICE VISIT (OUTPATIENT)
Dept: NEUROLOGY | Facility: CLINIC | Age: 39
End: 2024-04-26
Payer: COMMERCIAL

## 2024-04-26 VITALS
HEART RATE: 80 BPM | BODY MASS INDEX: 29.35 KG/M2 | WEIGHT: 187 LBS | SYSTOLIC BLOOD PRESSURE: 134 MMHG | HEIGHT: 67 IN | DIASTOLIC BLOOD PRESSURE: 80 MMHG | RESPIRATION RATE: 18 BRPM

## 2024-04-26 DIAGNOSIS — H47.10 PAPILLEDEMA: Primary | ICD-10-CM

## 2024-04-26 RX ORDER — ERGOCALCIFEROL 1.25 MG/1
50000 CAPSULE ORAL WEEKLY
COMMUNITY

## 2024-04-26 NOTE — PROGRESS NOTES
Neurology Consult Note    [facility]  [address], [address]  [place] [address]  Phone: [phone]  Fax: [phone]    Referring Provider:   No ref. provider found  Primary Care Provider:  Bogdan Martinez MD    Reason for Consult:  Papilledema   Subjective        Edvin Simmons presents to [facility]    History of Present Illness  38-year-old male seen for follow-up of papilledema.  Patient was initially seen in clinic on 1/26/2024.  At that time it was noted he was already started on Diamox.  He had not had a lumbar puncture.  We did obtain records from his optometrist as well as placed orders for lumbar puncture and CSF studies.  His opening pressure was approximately 22.  It is noted he was on Diamox.  This may have been elevated higher.  Patient did have a spinal headache following the lumbar puncture.  He did come to our office and we recommended him continuing increasing his caffeine.  Patient did present back to the emergency room.  They did try to schedule him for a blood patch however this was never completed.    Today he presents by himself.  Reports to me no headaches or vision changes.  He is tolerating the Diamox well.  He is set to have thyroidectomy on Thursday by MD Gregory for thyroid cancer.  He does report he saw ophthalmology who told him his optic disc were improving.  There is no problem list on file for this patient.       Past Medical History:   Diagnosis Date    Acid reflux     Gout         Social History     Socioeconomic History    Marital status:    Tobacco Use    Smoking status: Never    Smokeless tobacco: Never   Vaping Use    Vaping status: Never Used   Substance and Sexual Activity    Alcohol use: Never    Drug use: No    Sexual activity: Defer        No Known Allergies       Current Outpatient Medications:     acetaZOLAMIDE (DIAMOX) 500 MG capsule, , Disp: , Rfl:     allopurinol (ZYLOPRIM) 100 MG tablet, Take 1  "tablet by mouth Daily., Disp: , Rfl:     esomeprazole (NexIUM) 40 MG packet, Take 40 mg by mouth., Disp: , Rfl:     losartan (COZAAR) 25 MG tablet, Take 1 tablet by mouth Daily., Disp: , Rfl:     vitamin D (ERGOCALCIFEROL) 1.25 MG (48317 UT) capsule capsule, Take 1 capsule by mouth 1 (One) Time Per Week., Disp: , Rfl:        Objective   Vital Signs:   /80 (BP Location: Left arm, Patient Position: Sitting)   Pulse 80   Resp 18   Ht 170.2 cm (67\")   Wt 84.8 kg (187 lb)   BMI 29.29 kg/m²       Physical Exam  Vitals and nursing note reviewed.   Constitutional:       Appearance: Normal appearance.   HENT:      Head: Normocephalic.   Eyes:      General: Lids are normal.      Extraocular Movements: Extraocular movements intact.      Pupils: Pupils are equal, round, and reactive to light.   Pulmonary:      Effort: Pulmonary effort is normal. No respiratory distress.   Skin:     General: Skin is warm and dry.   Neurological:      Mental Status: He is alert.      Motor: Motor strength is normal.  Psychiatric:         Speech: Speech normal.        Neurological Exam  Mental Status  Alert. Oriented to person, place, time and situation. Speech is normal. Language is fluent with no aphasia.    Cranial Nerves  CN II: Visual fields full to confrontation.  CN III, IV, VI: Extraocular movements intact bilaterally. Normal lids and orbits bilaterally. Pupils equal round and reactive to light bilaterally.  CN VII: Full and symmetric facial movement.  CN XII: Tongue midline without atrophy or fasciculations.    Motor   Strength is 5/5 throughout all four extremities.    Gait  Casual gait is normal including stance, stride, and arm swing.      Result Review :   The following data was reviewed by: JENNIFER Gallegos on 04/26/2024:                    Impression:  Edvin Simmons is a 38 y.o. male who presents for follow-up of papilledema.  Patient did have a slightly elevated opening pressure of 22.  Should be noted he was " on Diamox therapy at the time of LP.  He did have papilledema per optometry exam.  We will continue Diamox as is.  He does report ophthalmology no improvement.  We will obtain these records to ensure accuracy.    Diagnoses and all orders for this visit:    1. Papilledema (Primary)        Plan:  Continue Diamox 500 mg daily  Obtain records from ophthalmology group  Notify our office for any worsening headaches or vision changes  Follow-up with PCP as scheduled  Follow-up with ophthalmology as scheduled  Follow-up in our clinic in 3 months or sooner if needed    The patient and I have discussed the plan of care and he is in full agreement at this time.     Follow Up   Return in about 3 months (around 7/26/2024) for Papilledema.            Melina Katz, JENNIFER  04/26/24  09:26 CDT

## 2024-07-26 ENCOUNTER — OFFICE VISIT (OUTPATIENT)
Dept: NEUROLOGY | Facility: CLINIC | Age: 39
End: 2024-07-26
Payer: COMMERCIAL

## 2024-07-26 VITALS
BODY MASS INDEX: 29.35 KG/M2 | HEIGHT: 67 IN | WEIGHT: 187 LBS | SYSTOLIC BLOOD PRESSURE: 128 MMHG | RESPIRATION RATE: 18 BRPM | HEART RATE: 78 BPM | DIASTOLIC BLOOD PRESSURE: 80 MMHG

## 2024-07-26 DIAGNOSIS — H47.10 PAPILLEDEMA: Primary | ICD-10-CM

## 2024-07-26 RX ORDER — LEVOTHYROXINE SODIUM 175 UG/1
175 TABLET ORAL DAILY
COMMUNITY

## 2024-07-26 NOTE — PROGRESS NOTES
Neurology Consult Note    [facility]  [address], [address]  [place] [address]  Phone: [phone]  Fax: [phone]    Referring Provider:   No ref. provider found  Primary Care Provider:  Bogdan Martinez MD    Reason for Consult:  Papilledema  Subjective        Edvin Simmons presents to [facility]    History of Present Illness  38-year-old male who I been following for papilledema.  Last in clinic on 4/26/2024.  During that encounter he continue patient on Diamox 500 mg daily.  Patient did have a lumbar puncture with opening pressure of 22.  However this was likely altered due to patient being on Diamox per Dr. Felder prior to lumbar puncture.  Patient did have papilledema on eye exam.  He did see ophthalmology who noted no papilledema however he was treated.      Today patient presents with his son.  Patient reports no headaches or vision changes since last being seen.  He continues to tolerate the Diamox well.  He does ask if Diamox he can cause excessive sweating.  He does note he had thyroidectomy performed for cancer.  He is waiting further workup per his physicians in regards to that.  There is no problem list on file for this patient.       Past Medical History:   Diagnosis Date    Acid reflux     Gout         Social History     Socioeconomic History    Marital status:    Tobacco Use    Smoking status: Never    Smokeless tobacco: Never   Vaping Use    Vaping status: Never Used   Substance and Sexual Activity    Alcohol use: Never    Drug use: No    Sexual activity: Defer        No Known Allergies       Current Outpatient Medications:     acetaZOLAMIDE (DIAMOX) 500 MG capsule, , Disp: , Rfl:     allopurinol (ZYLOPRIM) 100 MG tablet, Take 1 tablet by mouth Daily., Disp: , Rfl:     esomeprazole (NexIUM) 40 MG packet, Take 40 mg by mouth., Disp: , Rfl:     levothyroxine (SYNTHROID, LEVOTHROID) 175 MCG tablet, Take 1 tablet by mouth  "Daily., Disp: , Rfl:     losartan (COZAAR) 25 MG tablet, Take 1 tablet by mouth Daily., Disp: , Rfl:     vitamin D (ERGOCALCIFEROL) 1.25 MG (96458 UT) capsule capsule, Take 1 capsule by mouth 1 (One) Time Per Week., Disp: , Rfl:        Objective   Vital Signs:   /80 (BP Location: Left arm, Patient Position: Sitting)   Pulse 78   Resp 18   Ht 170.2 cm (67\")   Wt 84.8 kg (187 lb)   BMI 29.29 kg/m²       Physical Exam  Vitals and nursing note reviewed.   Constitutional:       Appearance: Normal appearance.   Eyes:      General: Lids are normal.      Extraocular Movements: Extraocular movements intact.   Pulmonary:      Effort: Pulmonary effort is normal. No respiratory distress.   Skin:     General: Skin is warm and dry.   Neurological:      Mental Status: He is alert.      Motor: Motor strength is normal.  Psychiatric:         Speech: Speech normal.        Neurological Exam  Mental Status  Alert. Oriented to person, place, time and situation. Speech is normal. Language is fluent with no aphasia.    Cranial Nerves  CN II: Right funduscopic exam: not visualized. Left funduscopic exam: not visualized.  CN III, IV, VI: Extraocular movements intact bilaterally. Normal lids and orbits bilaterally.  CN VII: Full and symmetric facial movement.  CN XII: Tongue midline without atrophy or fasciculations.    Motor   Strength is 5/5 throughout all four extremities.    Gait  Casual gait is normal including stance, stride, and arm swing.      Result Review :          EXTERNAL MEDICAL RECORDS - SCAN - Ophthal (04/26/2024)                  Impression:  Edvin Simmons is a 38 y.o. male who presents for follow-up of papilledema.  Patient has been stable on Diamox.  We will continue this.  Do recommend continued evaluation by ophthalmology.  He has had no headaches or vision changes.    Diagnoses and all orders for this visit:    1. Papilledema (Primary)        Plan:  Continue Diamox 500 mg daily  Follow-up with " ophthalmology as scheduled  Follow-up with primary care as scheduled  Follow-up in our clinic in 6 months or sooner if needed    The patient and I have discussed the plan of care and he is in full agreement at this time.     Follow Up   Return in about 6 months (around 1/26/2025) for Papilledema.            Melina Katz, JENNIFER  07/26/24  09:40 CDT

## 2024-09-04 PROBLEM — C73 PAPILLARY THYROID CARCINOMA: Status: ACTIVE | Noted: 2024-01-17

## 2024-09-06 PROBLEM — Z78.9 NON-SMOKER: Status: ACTIVE | Noted: 2024-09-06

## 2024-09-06 PROBLEM — E89.0 S/P TOTAL THYROIDECTOMY: Status: ACTIVE | Noted: 2024-09-06

## 2024-09-06 NOTE — PROGRESS NOTES
[facility]  Radiation Oncology Clinic   Yogesh Voss MD, FACR  Milton RODRIGUEZ  _______________________________________________  [facility]  Department of Radiation Oncology  [address] [place] [id number]  Office: [phone]  Fax: [phone]    DATE: 09/10/2024  PATIENT: Edvin Simmons  1985                         MEDICAL RECORD #: [id number]    1. Papillary thyroid carcinoma    2. S/P total thyroidectomy    3. Non-smoker                                             REASON FOR VISIT:    Chief Complaint   Patient presents with    Thyroid Cancer                                                        REASON FOR CONSULTATION:  Edvin Simmons is a 38 y.o. male that has been referred to our office to discuss radiotherapy recommendations for carcinoma of the thyroid. Denies appetite change, unexpected weight change, nasuea/vomiting, diarrhea, light-headedness, weakness, and headaches.     History of Present Illness:    10/18/2023 - Appointment with :  Presents with complaints of a submental fullness. He reports that 3 weeks ago, he developed a large swelling in the midline under his jaw. This occurred all of a sudden and was above his Josef's apple. Approximately 2 weeks later, he developed a sinus infection with sinus congestion. He started on an antibiotic, which has helped to decrease the size of the submental fullness.   Plan:  The fullness in question is somewhat suspicious for a thyroglossal duct cyst, that recently became inflamed and swollen.  According to Mr. Simmons, this did not elevate when he swallowed, somewhat less consistent with a thyroglossal duct cyst.  I would like to proceed with a contrasted CT scan of the neck to assess.  Based on the location, I do not think this is within the thyroid gland itself.  Once we have that CT scan, we will further evaluate for possible treatment options.    11/03/2023 - CT  Soft tissue neck with contrast:  A small lobulated nodule located in the midline adjacent and anterior to the proximal part of the thyrohyoid membrane may represent the remnant of the thyroglossal duct cyst or an ectopic thyroid tissue.   Bilateral thyroid nodules.  Moderate fullness of the palatine tonsils, right more than the left may represent chronic tonsillitis.   No evidence of cervical lymphadenopathy.   ADDENDUM:   Incidentally noted is an air column to the left of the distal trachea which represent displaced air-filled proximal esophagus. No communication with the trachea. This may be further evaluated/confirmed with contrast esophagram.   Incompletely visualized and evaluated moderate mediastinal lymphadenopathy. Level 4R lymph node measures 1.5 cm in short axis. Level 6 lymph nodes measure up to 1.8 cm in short axis. This may be further evaluated with contrast enhanced CT scan of the chest. No supraclavicular lymph nodes are cervical lymph nodes are noted.   A small subpleural or pleural-based nodule in the right upper lobe posteromedially, image #111 and series 3 may represent a focal pleural thickening or a pleural-based nodule?. This may also be further evaluated by CT scan of the chest.     12/06/2023 - US:  Targeted ultrasound of the palpable area at the midline neck demonstrates a 1.3 x 0.8 cm oval, parallel, hypoechoic, nearly anechoic, avascular mass, above the level of the thyroid. This tracks into the deep neck. Favor thyroglossal duct cyst.   RIGHT lobe. Measures 4.3 x 2.3 x 1.7 cm.   ISTHMUS. Measures 1.0 cm.   LEFT lobe. Measures 4.6 x 1.3 x 1.7 cm.   NODULE 1 -   Located -isthmus   Size -1.6 x 1.0 cm   NODULE 2 -   Located -RIGHT   Size -2.5 x 1.6 cm .     12/06/2023 - Appointment with :  He has a right thyroid mass this is concerning.  I would also like to assess to see if the midline mass is c/w thyroid tissue.  We will set him up for fine-needle aspiration of both.  We will  obtain a TSH baseline.   I discussed the possibility of thyroid lobectomy versus thyroidectomy and possible Sistrunk (especially if the FNA is concerning for cancer).    We discussed the function of the thyroid gland.     12/18/2023 - Right thyroid/isthmus FNA:  Thyroid, isthmus, fine-needle aspiration:  Billings category V: Suspicious for malignancy, consistent with papillary thyroid carcinoma.  Thyroid, right, fine-needle aspiration:  Billings category VI: Malignant, consistent with papillary thyroid carcinoma.    12/22/2023 - TSH: 1.650    01/16/2024 - Labs:  TSH - 1.89  Thyroglobulin - 7.58  Free T4 - 1.21    01/16/2024 - Lymph node, right superior neck, fine needle aspiration:  No metastatic carcinoma identified  Cellular evidence of lymph node sampling     05/01/2024 - Labs:  Free T4 - 1.08  TSH - 2.42    05/01/2024 - CT Chest with contrast:  Several pulmonary nodules are present, concerning for metastases, despite upper lobe location. Prominent hilar and mediastinal adenopathy is also present, again concerning for metastatic disease. However, in view of distribution of pulmonary nodules, the possibility of granulomatous disease cannot entirely be excluded. Correlation with prior external imaging and/or mediastinal barbi sampling may be considered if not previously performed.     05/01/2024 - CT Soft tissue neck with contrast:  Biopsy-proven multifocal papillary thyroid carcinoma without CT evidence of extrathyroidal extension.   Equivocal subcentimeter superior mediastinal lymph nodes.   Suspicious 2.1 cm right paratracheal mediastinal lymph node to be correlated with chest CT.   No lateral neck or lateral retropharyngeal adenopathy.     05/02/2024 - Total thyroidectomy and thyroglossal duct cyst excision per Dr. Richards:  Left paratracheal lymph node- frozen:  One lymph node, negative for tumor (0/1).  Parathyroid, right, right parathyroid- frozen:  One lymph node, negative for tumor (0/1).  Thyroid, total  thyroidectomy and thyroglossal duct:  PAPILLARY THYROID CARCINOMA, CONVENTIONAL TYPE  Location: Right lobe and isthmus  Multifocal: Yes  Size: 1.5 cm  Extrathyroidal extension: Present, extending into skeletal muscle.   No tumor present in 2 lymph nodes  Thyroglossal duct cyst    SPECIMEN   Procedure: Total thyroidectomy   TUMOR   Tumor Focality: Multifocal   Tumor Characteristics:      Tumor Site: Right lobe   Tumor Site: Isthmus   Tumor Size: Greatest Dimension (Centimeters): 1.5 cm   Histologic Tumor Types and Subtypes:  Papillary carcinoma, classic subtype   Tumor Proliferative Activity:      Mitotic Rate: Less than 3 mitoses per 2mm2   Tumor Necrosis: Not identified   Angioinvasion (vascular invasion): Not identified   Lymphatic Invasion: Present   Extrathyroidal Extension: Present, clinical / macroscopic AND histologically confirmed : Invading only strap muscles (i.e., pT3b)   Margin Status:  Carcinoma present at margin   Margin(s) Involved by Carcinoma: foccally anterior and posterior   REGIONAL LYMPH NODES   Regional Lymph Node Status: All regional lymph nodes negative for tumor   Number of Lymph Nodes Examined: 4   Lola Level(s) Examined: Level VI   pTNM CLASSIFICATION (AJCC 8th Edition)   Reporting of pT, pN, and (when applicable) pM categories is based on information available to the pathologist at the time the report is issued. As per the AJCC (Chapter 1, 8th Ed.) it is the managing physician's responsibility to establish the final pathologic stage based upon all pertinent information, including but potentially not limited to this pathology report.   pT Category: pT3b   pN Category: pN0     05/30/2024 - Bronchoscopy with biopsy:  Lymph node, right, lower paratracheal, 4 R, fine needle aspiration:  No metastatic carcinoma identified  Lymphoid tissue and necrosis only (see comment)  Lymph node, right hilar/10R, fine needle aspiration:  No metastatic carcinoma identified  Lymphoid tissue and necrosis  present    2024 - Documentation by Dr. Mattson - MD [place]:  Spoke to the patient with regards to labs that were done last week.  He is currently on 175 mcg of levothyroxine  Labs:  T.1  Tg Ab: < 1  TSH: 0.72  FT4: 1.26   Plan:  Continue current dose of levothyroxine of 175 mcg daily. He is feeling well. He notes that his headaches are better and optic nerve swelling is also getting better.  Reviewed role of TURK in management of thyroid cancer. Given extension into the skeletal muscle we will at least consider radioiodine scan and if it is positive we will consider radioiodine therapy with Thyrogen.  We will send him patient education information with regards to TURK and low iodine diet.  He had CT with contrast in May 2024 will try to arrange for TURK in 2024.  Patient is in agreement with the plan.    History obtained from  PATIENT, FAMILY, and CHART    PAST MEDICAL HISTORY  Past Medical History:   Diagnosis Date    Acid reflux     Gout     Thyroid cancer       PAST SURGICAL HISTORY  Past Surgical History:   Procedure Laterality Date    NO PAST SURGERIES      THYROID SURGERY Bilateral 2024      FAMILY HISTORY  family history includes No Known Problems in his father.    SOCIAL HISTORY  Social History     Tobacco Use    Smoking status: Never    Smokeless tobacco: Never   Vaping Use    Vaping status: Never Used   Substance Use Topics    Alcohol use: Never    Drug use: No     ALLERGIES  Patient has no known allergies.     MEDICATIONS    Current Outpatient Medications:     acetaZOLAMIDE (DIAMOX) 500 MG capsule, , Disp: , Rfl:     allopurinol (ZYLOPRIM) 100 MG tablet, Take 1 tablet by mouth Daily., Disp: , Rfl:     esomeprazole (NexIUM) 40 MG packet, Take 40 mg by mouth., Disp: , Rfl:     levothyroxine (SYNTHROID, LEVOTHROID) 175 MCG tablet, Take 1 tablet by mouth Daily., Disp: , Rfl:     losartan (COZAAR) 25 MG tablet, Take 1 tablet by mouth Daily., Disp: , Rfl:     vitamin D (ERGOCALCIFEROL)  "1.25 MG (79826 UT) capsule capsule, Take 1 capsule by mouth 1 (One) Time Per Week., Disp: , Rfl:     The following portions of the patient's history were reviewed and updated as appropriate: allergies, current medications, past family history, past medical history, past social history, past surgical history and problem list.    Current outpatient and discharge medications have been reconciled for the patient.  Reviewed by: Yogesh Stahl III, MD    REVIEW OF SYSTEMS  Review of Systems   Constitutional:  Negative for activity change, fatigue and unexpected weight change.   HENT: Negative.     Eyes: Negative.    Respiratory: Negative.     Cardiovascular: Negative.    Gastrointestinal: Negative.    Endocrine: Negative.    Genitourinary: Negative.    Musculoskeletal: Negative.    Skin: Negative.    Allergic/Immunologic: Negative.    Neurological: Negative.    Hematological: Negative.    Psychiatric/Behavioral: Negative.       PHYSICAL EXAM  VITAL SIGNS:   Vitals:    09/10/24 1258   BP: 141/86   Weight: 94.8 kg (209 lb)   Height: 170.2 cm (67\")   PainSc: 0-No pain     Physical Exam  Vitals reviewed.   Constitutional:       Appearance: Normal appearance.   HENT:      Head: Normocephalic.      Nose: Nose normal.   Eyes:      Pupils: Pupils are equal, round, and reactive to light.   Neck:      Comments: Well-healed surgical site from thyroidectomy.  No palpable cervical lymphadenopathy  Cardiovascular:      Rate and Rhythm: Normal rate and regular rhythm.      Pulses: Normal pulses.      Heart sounds: Normal heart sounds.   Pulmonary:      Effort: Pulmonary effort is normal. No respiratory distress.      Breath sounds: Normal breath sounds. No wheezing.   Abdominal:      General: Bowel sounds are normal.      Palpations: There is no mass.   Musculoskeletal:         General: Normal range of motion.      Cervical back: Normal range of motion and neck supple. No tenderness.   Lymphadenopathy:      Cervical: No cervical " adenopathy.   Skin:     General: Skin is warm and dry.      Capillary Refill: Capillary refill takes less than 2 seconds.   Neurological:      General: No focal deficit present.      Mental Status: He is alert and oriented to person, place, and time.      Motor: No weakness.   Psychiatric:         Mood and Affect: Mood normal.         Behavior: Behavior normal.       Performance Status: ECOG (0) Fully active, able to carry on all predisease performance without restriction    Clinical Quality Measures  - Pain Documented by Standardized Tool, FPS Edvin Simmons reports a pain score of 0.  Given his pain assessment as noted, treatment options were discussed and the following options were decided upon as a follow-up plan to address the patient's pain:  No pain, no plan given .    - Body Mass Index Screening and Follow-Up Plan  BMI is >= 30 and <35. (Class 1 Obesity). The following options were offered after discussion;: none (medical contraindication)    - Tobacco Use: Screening and Cessation Intervention  Social History    Tobacco Use      Smoking status: Never      Smokeless tobacco: Never    - Advanced Care Planning Advance Care Planning   ACP discussion was held with the patient during this visit. Patient does not have an advance directive, information provided.    - Depression screening - PHQ-9 Total Score: 0    ASSESSMENT AND PLAN  1. Papillary thyroid carcinoma    2. S/P total thyroidectomy    3. Non-smoker      Orders Placed This Encounter   Procedures    NM Thyroid Cancer Metastatic Scan Whole Body    TSH    Thyroglobulin     RECOMMENDATIONS: Edvin Simmons was diagnosed with thyroid carcinoma with multiple high risk factors.  This includes extrathyroidal extension with invasion into skeletal muscle, positive surgical margins, and lymphatic invasion.    With these high risk factors I recommend proceeding with iodine-131 ablation with follow-up whole-body scan.  I anticipate a dose of 100 mCi of  iodine-131.    Indications and rationale of radiotherapy treatments according to the NCCN Guidelines for thyroid carcinoma has been discussed with the patient and her family today. We reviewed the risks, benefits and alternatives of therapy. I have seen, examined and reviewed this patient's medication list, appropriate labs and imaging studies.  I reviewed relevant medical records and other physicians notes. I discussed the goals and plans of care with the patient and answered all questions.   Patient verbalized understanding, voices no questions and agrees to proceed with this recommended therapy.    Continue ongoing management per primary care physician and other specialists. Thank you for allowing me to assist in his care.     Time Spent: I spent 70 minutes caring for Edvin on this date of service. This time includes time spent by me in the following activities: preparing for the visit, reviewing tests, obtaining and/or reviewing a separately obtained history, performing a medically appropriate examination and/or evaluation, counseling and educating the patient/family/caregiver, ordering medications, tests, or procedures, and documenting information in the medical record.   Yogesh Stahl III, MD  09/10/2024

## 2024-09-09 ENCOUNTER — HOSPITAL ENCOUNTER (OUTPATIENT)
Dept: RADIATION ONCOLOGY | Facility: HOSPITAL | Age: 39
Setting detail: RADIATION/ONCOLOGY SERIES
End: 2024-09-09
Payer: COMMERCIAL

## 2024-09-10 ENCOUNTER — CONSULT (OUTPATIENT)
Age: 39
End: 2024-09-10
Payer: COMMERCIAL

## 2024-09-10 VITALS
WEIGHT: 209 LBS | DIASTOLIC BLOOD PRESSURE: 86 MMHG | SYSTOLIC BLOOD PRESSURE: 141 MMHG | HEIGHT: 67 IN | BODY MASS INDEX: 32.8 KG/M2

## 2024-09-10 DIAGNOSIS — E89.0 S/P TOTAL THYROIDECTOMY: ICD-10-CM

## 2024-09-10 DIAGNOSIS — C73 PAPILLARY THYROID CARCINOMA: Primary | ICD-10-CM

## 2024-09-10 DIAGNOSIS — Z78.9 NON-SMOKER: ICD-10-CM

## 2024-09-10 PROCEDURE — G0463 HOSPITAL OUTPT CLINIC VISIT: HCPCS | Performed by: RADIOLOGY

## 2024-09-10 NOTE — PATIENT INSTRUCTIONS
1)  We will order I-131 whole body scan per recommendations of Dr. Mattson at [facility]  2)  They will call you about scan, we will schedule after November 3rd  3)  You will need low iodine diet to prepare for scan  4)  If scan is positive, we may proceed with I-131 treatment if recommended by Dr. Mattson.  If you receive I-131 treatment you will need to take about a week off from work.

## 2024-10-25 ENCOUNTER — TELEPHONE (OUTPATIENT)
Age: 39
End: 2024-10-25
Payer: COMMERCIAL

## 2024-11-04 ENCOUNTER — INFUSION (OUTPATIENT)
Dept: ONCOLOGY | Facility: HOSPITAL | Age: 39
End: 2024-11-04
Payer: COMMERCIAL

## 2024-11-04 VITALS
SYSTOLIC BLOOD PRESSURE: 125 MMHG | DIASTOLIC BLOOD PRESSURE: 77 MMHG | HEIGHT: 67 IN | BODY MASS INDEX: 31.86 KG/M2 | HEART RATE: 80 BPM | OXYGEN SATURATION: 96 % | RESPIRATION RATE: 16 BRPM | WEIGHT: 203 LBS | TEMPERATURE: 98.1 F

## 2024-11-04 DIAGNOSIS — E89.0 S/P TOTAL THYROIDECTOMY: Primary | ICD-10-CM

## 2024-11-04 PROCEDURE — 96372 THER/PROPH/DIAG INJ SC/IM: CPT

## 2024-11-04 PROCEDURE — 25010000002 THYROTROPIN ALFA 0.9 MG RECONSTITUTED SOLUTION: Performed by: RADIOLOGY

## 2024-11-04 RX ADMIN — THYROTROPIN ALFA 0.9 MG: 0.9 INJECTION, POWDER, FOR SOLUTION INTRAMUSCULAR at 09:10

## 2024-11-04 NOTE — PROGRESS NOTES
Thyrogen initiated today. Patient tolerated well and monitored for 15 minutes following injection.  Patient voices awareness to RTC in a.m. for second injection.  PONCE Deshpande RN

## 2024-11-05 ENCOUNTER — INFUSION (OUTPATIENT)
Dept: ONCOLOGY | Facility: HOSPITAL | Age: 39
End: 2024-11-05
Payer: COMMERCIAL

## 2024-11-05 VITALS
WEIGHT: 203 LBS | SYSTOLIC BLOOD PRESSURE: 131 MMHG | HEIGHT: 67 IN | BODY MASS INDEX: 31.86 KG/M2 | OXYGEN SATURATION: 96 % | TEMPERATURE: 97 F | HEART RATE: 78 BPM | DIASTOLIC BLOOD PRESSURE: 82 MMHG | RESPIRATION RATE: 18 BRPM

## 2024-11-05 DIAGNOSIS — E89.0 S/P TOTAL THYROIDECTOMY: Primary | ICD-10-CM

## 2024-11-05 PROCEDURE — 96372 THER/PROPH/DIAG INJ SC/IM: CPT

## 2024-11-05 PROCEDURE — 25010000002 THYROTROPIN ALFA 0.9 MG RECONSTITUTED SOLUTION: Performed by: RADIOLOGY

## 2024-11-05 RX ADMIN — THYROTROPIN ALFA 0.9 MG: 0.9 INJECTION, POWDER, FOR SOLUTION INTRAMUSCULAR at 08:57

## 2024-11-06 ENCOUNTER — HOSPITAL ENCOUNTER (OUTPATIENT)
Dept: NUCLEAR MEDICINE | Facility: HOSPITAL | Age: 39
Discharge: HOME | End: 2024-11-06
Payer: COMMERCIAL

## 2024-11-06 ENCOUNTER — LAB (OUTPATIENT)
Dept: LAB | Facility: HOSPITAL | Age: 39
End: 2024-11-06
Payer: COMMERCIAL

## 2024-11-06 DIAGNOSIS — C73 PAPILLARY THYROID CARCINOMA: Primary | ICD-10-CM

## 2024-11-06 DIAGNOSIS — E89.0 S/P TOTAL THYROIDECTOMY: ICD-10-CM

## 2024-11-06 DIAGNOSIS — Z92.3 H/O RADIOACTIVE IODINE THYROID ABLATION: ICD-10-CM

## 2024-11-06 DIAGNOSIS — C73 PAPILLARY THYROID CARCINOMA: ICD-10-CM

## 2024-11-06 LAB — TSH SERPL DL<=0.05 MIU/L-ACNC: 104.1 UIU/ML (ref 0.27–4.2)

## 2024-11-06 PROCEDURE — 84443 ASSAY THYROID STIM HORMONE: CPT

## 2024-11-06 PROCEDURE — 36415 COLL VENOUS BLD VENIPUNCTURE: CPT

## 2024-11-06 PROCEDURE — 84432 ASSAY OF THYROGLOBULIN: CPT

## 2024-11-06 PROCEDURE — 0 SODIUM IODIDE CAPSULE: Performed by: RADIOLOGY

## 2024-11-06 PROCEDURE — 78018 THYROID MET IMAGING BODY: CPT

## 2024-11-06 PROCEDURE — A9517 I131 IODIDE CAP, RX: HCPCS | Performed by: RADIOLOGY

## 2024-11-06 RX ADMIN — SODIUM IODIDE I 131 1 CAPSULE: 100 CAPSULE ORAL at 10:30

## 2024-11-11 ENCOUNTER — HOSPITAL ENCOUNTER (OUTPATIENT)
Dept: NUCLEAR MEDICINE | Facility: HOSPITAL | Age: 39
Discharge: HOME | End: 2024-11-11
Payer: COMMERCIAL

## 2024-11-12 ENCOUNTER — TELEPHONE (OUTPATIENT)
Age: 39
End: 2024-11-12
Payer: COMMERCIAL

## 2024-11-12 NOTE — TELEPHONE ENCOUNTER
Patient returned call.  Patient had I-131 WBS last week.  Gave him results from scan.    He does have uptake in thyroid bed area but this is expected after Radioactive iodine ablation.    He verbalized appreciation.

## 2024-11-12 NOTE — TELEPHONE ENCOUNTER
----- Message from Yogesh Stahl sent at 11/11/2024  4:23 PM CST -----  Notify him of results, this is normal  ----- Message -----  From: Benign[place], Rad Results [place] In  Sent: 11/11/2024   2:24 PM CST  To: Yogesh Stahl III, MD

## 2024-11-19 LAB — THYROGLOB SERPL-MCNC: <2 NG/ML

## 2025-01-31 DIAGNOSIS — H47.10 PAPILLEDEMA: Primary | ICD-10-CM

## 2025-01-31 RX ORDER — ACETAZOLAMIDE 500 MG/1
500 CAPSULE, EXTENDED RELEASE ORAL 2 TIMES DAILY
Qty: 180 CAPSULE | Refills: 3 | Status: SHIPPED | OUTPATIENT
Start: 2025-01-31 | End: 2026-01-31

## 2025-01-31 NOTE — PROGRESS NOTES
Received a call today from Dr. Fletcher with ophthalmology group.  Reporting he saw Edvin in clinic.  Noted papilledema has not improved.  He still has full visual fields.  He did ask me about increasing his Diamox.  I have no objections to this and agree with his recommendation.  We will increase his Diamox to 500 mg twice daily.  I did try to attempt and call the patient.  I did leave a detailed message on his phone about our instructions.  Additional left our office number in case he has any questions.    Plan:  Increase Diamox 500 mg capsule, 1 capsule twice daily  Follow-up with ophthalmology as scheduled  Follow-up with our office as scheduled

## 2025-03-20 ENCOUNTER — OFFICE VISIT (OUTPATIENT)
Dept: NEUROLOGY | Facility: CLINIC | Age: 40
End: 2025-03-20
Payer: COMMERCIAL

## 2025-03-20 VITALS
WEIGHT: 205 LBS | BODY MASS INDEX: 32.18 KG/M2 | SYSTOLIC BLOOD PRESSURE: 128 MMHG | HEART RATE: 77 BPM | OXYGEN SATURATION: 97 % | HEIGHT: 67 IN | DIASTOLIC BLOOD PRESSURE: 78 MMHG

## 2025-03-20 DIAGNOSIS — Z71.1 CONCERN ABOUT MEMORY: ICD-10-CM

## 2025-03-20 DIAGNOSIS — H47.10 PAPILLEDEMA: Primary | ICD-10-CM

## 2025-03-20 RX ORDER — LEVOTHYROXINE SODIUM 200 UG/1
200 TABLET ORAL
COMMUNITY
Start: 2025-02-21 | End: 2026-02-22

## 2025-03-20 RX ORDER — ALLOPURINOL 300 MG/1
300 TABLET ORAL DAILY
COMMUNITY

## 2025-03-20 NOTE — PROGRESS NOTES
"  Neurology Consult Note    [facility]  [address], [address]  [place] [address]  Phone: [phone]  Fax: [phone]    Referring Provider:   No ref. provider found  Primary Care Provider:  Bogdan Martinez MD    Reason for Consult:  Papilledema  Subjective        Edvin Simmons presents to [facility]    History of Present Illness  39-year-old male seen for the follow-up of papilledema.  He was last seen in clinic on 7/26/2024.  During that encounter patient reports no headaches or vision changes since being seen last.  He does tell me he was taking his Diamox 500 mg daily.    He most recently saw ophthalmology on 1/31/2025.  Review that note shows he does have 2+ edema of the right eye and 1-2+ edema of the left eye.  It was recommended for him to increase his Diamox to twice daily dosing.  We did call and discussed with patient that they can increase his medications with new prescription sent.    Today patient presents the spouse.  Reports to me no change in headache frequency nor vision.  It is continuous Diamox.  He has no concerns.    His wife reports concerns with memory.  When asked to be more specific she reports \"everything\".  Further descriptions include forgetting where he puts objects or forgetting conversations that he and his wife had the day prior.  Patient denies any functional issues at work.  No performance issues.  He was on Adderall as a child for attention issues.  He denies any loss of functional independence.  He does have a history of memory syndromes and a maternal grandmother.  His highest level of education completed is a high school diploma with some college.  He does work at [employer].  He denies any history of head injuries.  I did ask some specific questions in regards to his function, these we outlined below:  Driving: Denies issues driving.  Does not make wrong turns or get lost.  No unexplained marks or dings on " "his vehicle.  Cooking: Denies issues cooking.  Does not burn food or leave the burners or oven turned on.  Medications: Patient does handle his own medications.  He does have a pill organizer however does not utilize it.  Finances: Spouse pays the bills and always has.  Patient denies any issues handling monetary transactions through stores.  ADLs: Patient reports independence with bathing and elimination.  He is able to dress himself.  Housework: Patient denies any issues.  Spouse denies any deviation from baseline.  Patient Active Problem List   Diagnosis    Papillary thyroid carcinoma    S/P total thyroidectomy    Non-smoker        Past Medical History:   Diagnosis Date    Acid reflux     Gout     Thyroid cancer         Social History     Socioeconomic History    Marital status:    Tobacco Use    Smoking status: Never    Smokeless tobacco: Never   Vaping Use    Vaping status: Never Used   Substance and Sexual Activity    Alcohol use: Never    Drug use: No    Sexual activity: Defer        No Known Allergies       Current Outpatient Medications:     acetaZOLAMIDE (DIAMOX) 500 MG capsule, Take 1 capsule by mouth 2 (Two) Times a Day., Disp: 180 capsule, Rfl: 3    allopurinol (ZYLOPRIM) 300 MG tablet, Take 1 tablet by mouth Daily., Disp: , Rfl:     esomeprazole (NexIUM) 40 MG packet, Take 40 mg by mouth., Disp: , Rfl:     levothyroxine (SYNTHROID, LEVOTHROID) 200 MCG tablet, Take 1 tablet by mouth., Disp: , Rfl:     losartan (COZAAR) 25 MG tablet, Take 1 tablet by mouth Daily., Disp: , Rfl:     vitamin D (ERGOCALCIFEROL) 1.25 MG (96998 UT) capsule capsule, Take 1 capsule by mouth 1 (One) Time Per Week., Disp: , Rfl:        Objective   Vital Signs:   /78   Pulse 77   Ht 170.2 cm (67.01\")   Wt 93 kg (205 lb)   SpO2 97%   BMI 32.10 kg/m²       Physical Exam  Vitals and nursing note reviewed.   Constitutional:       Appearance: Normal appearance.   HENT:      Head: Normocephalic.   Eyes:      General: " Lids are normal.      Extraocular Movements: Extraocular movements intact.      Pupils: Pupils are equal, round, and reactive to light.   Pulmonary:      Effort: Pulmonary effort is normal. No respiratory distress.   Skin:     General: Skin is warm and dry.   Neurological:      Mental Status: He is alert.      Motor: Motor strength is normal.  Psychiatric:         Speech: Speech normal.        Neurological Exam  Mental Status  Alert. Oriented to person, place, time and situation. Speech is normal. Language is fluent with no aphasia.    Cranial Nerves  CN II: Visual fields full to confrontation.  CN III, IV, VI: Extraocular movements intact bilaterally. Normal lids and orbits bilaterally. Pupils equal round and reactive to light bilaterally.  CN V: Facial sensation is normal.  CN VII: Full and symmetric facial movement.  CN IX, X: Palate elevates symmetrically. Normal gag reflex.  CN XI: Shoulder shrug strength is normal.  CN XII: Tongue midline without atrophy or fasciculations.    Motor   Strength is 5/5 throughout all four extremities.    Gait  Casual gait is normal including stance, stride, and arm swing.      Result Review :   The following data was reviewed by: JENNIFER Gallegos on 03/20/2025:       OPHTHALMOLOGY - SCAN by [facility] (01/31/2025 00:00)       Progress Notes by Melina Katz APRN (07/26/2024 09:30)                Impression:  Edvin Simmons is a 39 y.o. male who presents for follow-up of papilledema.  Overall patient is doing well subjectively.  There is some objective evidence of continued edema of the optic disc.  He has been placed on Diamox 500 mg twice a day.  We are contacting patient to confirm his dosing.  Regards to his memory complaints from his spouse, less likely symptoms of a cognitive impairment.  Patient does have a history of attention deficit as a child and was treated with Adderall.  His MoCA was normal at 26 out of 30.  Recommend no further workup.  Will  continue to monitor.    Diagnoses and all orders for this visit:    1. Papilledema (Primary)    2. Concern about memory        Plan:  Continue Diamox 500 mg capsule, 1 capsule twice daily  MoCA 26 out of 30  Follow memory complaints  Contact our office any worsening headaches or vision changes  Follow-up with PCP as scheduled  Follow-up with ophthalmology as scheduled  Follow-up with me in 6 months or sooner if need be    The patient and I have discussed the plan of care and he is in full agreement at this time.     Follow Up   Return in about 6 months (around 9/20/2025) for Papilledema.            Melina Katz, JENNIFER  03/20/25  15:16 CDT